# Patient Record
Sex: MALE | Race: WHITE | Employment: OTHER | URBAN - METROPOLITAN AREA
[De-identification: names, ages, dates, MRNs, and addresses within clinical notes are randomized per-mention and may not be internally consistent; named-entity substitution may affect disease eponyms.]

---

## 2017-10-03 RX ORDER — CEFAZOLIN SODIUM 2 G/100ML
2 INJECTION, SOLUTION INTRAVENOUS
Status: CANCELLED | OUTPATIENT
Start: 2017-10-03

## 2017-10-03 RX ORDER — CEFAZOLIN SODIUM 1 G/3ML
1 INJECTION, POWDER, FOR SOLUTION INTRAMUSCULAR; INTRAVENOUS SEE ADMIN INSTRUCTIONS
Status: CANCELLED | OUTPATIENT
Start: 2017-10-03

## 2017-10-03 RX ORDER — ROSUVASTATIN CALCIUM 10 MG/1
10 TABLET, COATED ORAL DAILY
COMMUNITY

## 2017-10-05 ENCOUNTER — HOSPITAL ENCOUNTER (OUTPATIENT)
Dept: LAB | Facility: CLINIC | Age: 72
Discharge: HOME | End: 2017-10-05
Attending: ORTHOPAEDIC SURGERY | Admitting: ORTHOPAEDIC SURGERY
Payer: MEDICARE

## 2017-10-05 DIAGNOSIS — Z01.812 PRE-OPERATIVE LABORATORY EXAMINATION: ICD-10-CM

## 2017-10-05 LAB
MRSA DNA SPEC QL NAA+PROBE: NEGATIVE
SPECIMEN SOURCE: NORMAL

## 2017-10-05 PROCEDURE — 87641 MR-STAPH DNA AMP PROBE: CPT | Performed by: ORTHOPAEDIC SURGERY

## 2017-10-05 PROCEDURE — 40000830 ZZHCL STATISTIC STAPH AUREUS METH RESIST SCREEN PCR: Performed by: ORTHOPAEDIC SURGERY

## 2017-10-05 PROCEDURE — 40000829 ZZHCL STATISTIC STAPH AUREUS SUSCEPT SCREEN PCR: Performed by: ORTHOPAEDIC SURGERY

## 2017-10-05 PROCEDURE — 87640 STAPH A DNA AMP PROBE: CPT | Performed by: ORTHOPAEDIC SURGERY

## 2017-10-06 RX ORDER — CEFAZOLIN SODIUM 2 G/100ML
2 INJECTION, SOLUTION INTRAVENOUS
Status: CANCELLED | OUTPATIENT
Start: 2017-10-06

## 2017-10-06 RX ORDER — CEFAZOLIN SODIUM 1 G/3ML
1 INJECTION, POWDER, FOR SOLUTION INTRAMUSCULAR; INTRAVENOUS SEE ADMIN INSTRUCTIONS
Status: CANCELLED | OUTPATIENT
Start: 2017-10-06

## 2017-10-12 NOTE — PROGRESS NOTES
Pre-Surgery Review for Right Total Hip Arthroplasty on 10/18/2017:   Patient flagged with the following risk factors:   - Cardiac:  1) Abnormal EKG at pre-op 10/3/17 suggestive of possible past myocardial injury-see results below. Echo completed 10/9/17- EF 55%, global and regional left ventricular function normal, normal left ventricular diastolic function.  Per PCP no angina or SOB and able to perform 4 METS w/o any cardiac symptoms so no stress test needed.  Cleared by PCP for surgery.      2) HTN: controlled on Metoprolol, Lisinopril and HCTZ.   3) HLD: Had severe reaction to simvastatin. Appears is now on rosuvastatin.   EKG 10/3/2017: sinus bradycardia, right bundle branch block, left anterior fascicular block and Q waves in the anterior leads suggestive of possible septal infarct.   Echocardiogram 10/9/2017: Left ventricular chamber size is normal. Normal left ventricular wall thickness. Global and regional left ventricular function is normal. Left ventricular ejection fraction is visually estimated at 55%. Normal left ventricular diastolic function.      - Prediabetes: A1C 6.4% on 9/22/17: not currently on any medications- per PCP may consider starting Metformin in the future.   - S/P Right Kidney Donation to brother: No mention of any complications.   - History of Iron Deficiency Anemia 5/2016: evaluated and previously treated by GI with oral iron. Not currently anemic. Hgb 15.6 on 9/22/17.     - Obstructive Sleep Apnea: Follows with [facility]. Uses CPAP- reminded to bring to hospital.   - Depression: on sertraline   Assessment/Plan:  Risk Factors identified above. Consider Hospitalist consult to assist with medical management. Recommend close monitoring of BG s in michael-op period and avoiding IV fluids containing dextrose given history of pre-diabetes. Goal BG <200 to decrease risk of infection and wound healing complications. Notify Surgeon or Hospitalist if BG s >200. Monitor  renal function and avoid nephrotoxins. Encourage use of CPAP. Resume sertraline post-op.

## 2017-10-13 NOTE — PHARMACY-ADMISSION MEDICATION HISTORY
Med rec complete per preadmitting RN:  Elzibeta Bar, RN on 10/3/2017 at 4:22 PM      Prior to Admission medications    Medication Sig Last Dose Taking? Auth Provider   METOPROLOL TARTRATE PO Take 50 mg by mouth 2 times daily  Yes Reported, Patient   LISINOPRIL PO Take 10 mg by mouth daily  Yes Reported, Patient   HYDROCHLOROTHIAZIDE PO Take 25 mg by mouth daily  Yes Reported, Patient   SERTRALINE HCL PO Take 100 mg by mouth daily  Yes Reported, Patient   rosuvastatin (CRESTOR) 10 MG tablet Take 10 mg by mouth daily  Yes Reported, Patient   IBUPROFEN PO Take 400 mg by mouth every 6 hours as needed for moderate pain  Yes Reported, Patient   ASPIRIN PO Take 81 mg by mouth daily  Yes Reported, Patient

## 2017-10-18 ENCOUNTER — ANESTHESIA EVENT (OUTPATIENT)
Dept: SURGERY | Facility: CLINIC | Age: 72
DRG: 470 | End: 2017-10-18
Payer: MEDICARE

## 2017-10-18 ENCOUNTER — ANESTHESIA (OUTPATIENT)
Dept: SURGERY | Facility: CLINIC | Age: 72
DRG: 470 | End: 2017-10-18
Payer: MEDICARE

## 2017-10-18 ENCOUNTER — APPOINTMENT (OUTPATIENT)
Dept: GENERAL RADIOLOGY | Facility: CLINIC | Age: 72
DRG: 470 | End: 2017-10-18
Attending: ORTHOPAEDIC SURGERY
Payer: MEDICARE

## 2017-10-18 ENCOUNTER — HOSPITAL ENCOUNTER (INPATIENT)
Facility: CLINIC | Age: 72
LOS: 2 days | Discharge: HOME | DRG: 470 | End: 2017-10-20
Attending: ORTHOPAEDIC SURGERY | Admitting: ORTHOPAEDIC SURGERY
Payer: MEDICARE

## 2017-10-18 DIAGNOSIS — Z96.641 STATUS POST TOTAL REPLACEMENT OF RIGHT HIP: Primary | ICD-10-CM

## 2017-10-18 LAB
ANION GAP SERPL CALCULATED.3IONS-SCNC: 6 MMOL/L (ref 3–14)
BASOPHILS # BLD AUTO: 0 10E9/L (ref 0–0.2)
BASOPHILS NFR BLD AUTO: 0.2 %
BUN SERPL-MCNC: 23 MG/DL (ref 7–30)
CALCIUM SERPL-MCNC: 8.2 MG/DL (ref 8.5–10.1)
CHLORIDE SERPL-SCNC: 100 MMOL/L (ref 94–109)
CO2 SERPL-SCNC: 31 MMOL/L (ref 20–32)
CREAT SERPL-MCNC: 1.31 MG/DL (ref 0.66–1.25)
DIFFERENTIAL METHOD BLD: ABNORMAL
EOSINOPHIL # BLD AUTO: 0 10E9/L (ref 0–0.7)
EOSINOPHIL NFR BLD AUTO: 0.1 %
ERYTHROCYTE [DISTWIDTH] IN BLOOD BY AUTOMATED COUNT: 13.2 % (ref 10–15)
GFR SERPL CREATININE-BSD FRML MDRD: 54 ML/MIN/1.7M2
GLUCOSE BLDC GLUCOMTR-MCNC: 142 MG/DL (ref 70–99)
GLUCOSE BLDC GLUCOMTR-MCNC: 202 MG/DL (ref 70–99)
GLUCOSE SERPL-MCNC: 202 MG/DL (ref 70–99)
HCT VFR BLD AUTO: 39.4 % (ref 40–53)
HGB BLD-MCNC: 13 G/DL (ref 13.3–17.7)
IMM GRANULOCYTES # BLD: 0 10E9/L (ref 0–0.4)
IMM GRANULOCYTES NFR BLD: 0.3 %
LYMPHOCYTES # BLD AUTO: 0.8 10E9/L (ref 0.8–5.3)
LYMPHOCYTES NFR BLD AUTO: 6.4 %
MAGNESIUM SERPL-MCNC: 2.3 MG/DL (ref 1.6–2.3)
MCH RBC QN AUTO: 28.4 PG (ref 26.5–33)
MCHC RBC AUTO-ENTMCNC: 33 G/DL (ref 31.5–36.5)
MCV RBC AUTO: 86 FL (ref 78–100)
MONOCYTES # BLD AUTO: 0.2 10E9/L (ref 0–1.3)
MONOCYTES NFR BLD AUTO: 1.3 %
NEUTROPHILS # BLD AUTO: 11 10E9/L (ref 1.6–8.3)
NEUTROPHILS NFR BLD AUTO: 91.7 %
NRBC # BLD AUTO: 0 10*3/UL
NRBC BLD AUTO-RTO: 0 /100
PLATELET # BLD AUTO: 211 10E9/L (ref 150–450)
POTASSIUM SERPL-SCNC: 3.4 MMOL/L (ref 3.4–5.3)
RBC # BLD AUTO: 4.58 10E12/L (ref 4.4–5.9)
SODIUM SERPL-SCNC: 137 MMOL/L (ref 133–144)
TROPONIN I SERPL-MCNC: <0.015 UG/L (ref 0–0.04)
TROPONIN I SERPL-MCNC: <0.015 UG/L (ref 0–0.04)
WBC # BLD AUTO: 12 10E9/L (ref 4–11)

## 2017-10-18 PROCEDURE — 27210794 ZZH OR GENERAL SUPPLY STERILE: Performed by: ORTHOPAEDIC SURGERY

## 2017-10-18 PROCEDURE — 83735 ASSAY OF MAGNESIUM: CPT | Performed by: INTERNAL MEDICINE

## 2017-10-18 PROCEDURE — 25000128 H RX IP 250 OP 636: Performed by: ORTHOPAEDIC SURGERY

## 2017-10-18 PROCEDURE — 36415 COLL VENOUS BLD VENIPUNCTURE: CPT | Performed by: INTERNAL MEDICINE

## 2017-10-18 PROCEDURE — A9270 NON-COVERED ITEM OR SERVICE: HCPCS | Mod: GY | Performed by: ORTHOPAEDIC SURGERY

## 2017-10-18 PROCEDURE — 36000063 ZZH SURGERY LEVEL 4 EA 15 ADDTL MIN: Performed by: ORTHOPAEDIC SURGERY

## 2017-10-18 PROCEDURE — 25000128 H RX IP 250 OP 636: Performed by: ANESTHESIOLOGY

## 2017-10-18 PROCEDURE — 99207 ZZC CONSULT E&M CHANGED TO INITIAL LEVEL: CPT | Performed by: INTERNAL MEDICINE

## 2017-10-18 PROCEDURE — 25000125 ZZHC RX 250: Performed by: NURSE ANESTHETIST, CERTIFIED REGISTERED

## 2017-10-18 PROCEDURE — C1776 JOINT DEVICE (IMPLANTABLE): HCPCS | Performed by: ORTHOPAEDIC SURGERY

## 2017-10-18 PROCEDURE — 93005 ELECTROCARDIOGRAM TRACING: CPT

## 2017-10-18 PROCEDURE — 93010 ELECTROCARDIOGRAM REPORT: CPT | Performed by: INTERNAL MEDICINE

## 2017-10-18 PROCEDURE — S5010 5% DEXTROSE AND 0.45% SALINE: HCPCS | Performed by: ORTHOPAEDIC SURGERY

## 2017-10-18 PROCEDURE — 37000008 ZZH ANESTHESIA TECHNICAL FEE, 1ST 30 MIN: Performed by: ORTHOPAEDIC SURGERY

## 2017-10-18 PROCEDURE — 25000131 ZZH RX MED GY IP 250 OP 636 PS 637: Mod: GY | Performed by: INTERNAL MEDICINE

## 2017-10-18 PROCEDURE — 25000128 H RX IP 250 OP 636: Performed by: NURSE ANESTHETIST, CERTIFIED REGISTERED

## 2017-10-18 PROCEDURE — 99222 1ST HOSP IP/OBS MODERATE 55: CPT | Performed by: INTERNAL MEDICINE

## 2017-10-18 PROCEDURE — 37000009 ZZH ANESTHESIA TECHNICAL FEE, EACH ADDTL 15 MIN: Performed by: ORTHOPAEDIC SURGERY

## 2017-10-18 PROCEDURE — 36000093 ZZH SURGERY LEVEL 4 1ST 30 MIN: Performed by: ORTHOPAEDIC SURGERY

## 2017-10-18 PROCEDURE — 12000007 ZZH R&B INTERMEDIATE

## 2017-10-18 PROCEDURE — 85025 COMPLETE CBC W/AUTO DIFF WBC: CPT | Performed by: INTERNAL MEDICINE

## 2017-10-18 PROCEDURE — 40000985 XR PELVIS AD HIP PORTABLE RIGHT 1 VIEW

## 2017-10-18 PROCEDURE — 25000566 ZZH SEVOFLURANE, EA 15 MIN: Performed by: ORTHOPAEDIC SURGERY

## 2017-10-18 PROCEDURE — 25800025 ZZH RX 258: Performed by: ORTHOPAEDIC SURGERY

## 2017-10-18 PROCEDURE — 40000306 ZZH STATISTIC PRE PROC ASSESS II: Performed by: ORTHOPAEDIC SURGERY

## 2017-10-18 PROCEDURE — 27810169 ZZH OR IMPLANT GENERAL: Performed by: ORTHOPAEDIC SURGERY

## 2017-10-18 PROCEDURE — 25000132 ZZH RX MED GY IP 250 OP 250 PS 637: Mod: GY | Performed by: ORTHOPAEDIC SURGERY

## 2017-10-18 PROCEDURE — 40000275 ZZH STATISTIC RCP TIME EA 10 MIN

## 2017-10-18 PROCEDURE — 0SR9019 REPLACEMENT OF RIGHT HIP JOINT WITH METAL SYNTHETIC SUBSTITUTE, CEMENTED, OPEN APPROACH: ICD-10-PCS | Performed by: ORTHOPAEDIC SURGERY

## 2017-10-18 PROCEDURE — 00000146 ZZHCL STATISTIC GLUCOSE BY METER IP

## 2017-10-18 PROCEDURE — 84484 ASSAY OF TROPONIN QUANT: CPT | Performed by: INTERNAL MEDICINE

## 2017-10-18 PROCEDURE — 80048 BASIC METABOLIC PNL TOTAL CA: CPT | Performed by: INTERNAL MEDICINE

## 2017-10-18 PROCEDURE — 71000013 ZZH RECOVERY PHASE 1 LEVEL 1 EA ADDTL HR: Performed by: ORTHOPAEDIC SURGERY

## 2017-10-18 PROCEDURE — 71000012 ZZH RECOVERY PHASE 1 LEVEL 1 FIRST HR: Performed by: ORTHOPAEDIC SURGERY

## 2017-10-18 DEVICE — IMP HOLE COVER SNN ACETAB CUP REFLEC INTERFIT 71336500: Type: IMPLANTABLE DEVICE | Site: HIP | Status: FUNCTIONAL

## 2017-10-18 DEVICE — IMP HEAD FEMORAL SNN 12/14 OXIN 36MM -3MM 71343603: Type: IMPLANTABLE DEVICE | Site: HIP | Status: FUNCTIONAL

## 2017-10-18 DEVICE — IMPLANTABLE DEVICE: Type: IMPLANTABLE DEVICE | Site: HIP | Status: FUNCTIONAL

## 2017-10-18 RX ORDER — ACETAMINOPHEN 10 MG/ML
1000 INJECTION, SOLUTION INTRAVENOUS ONCE
Status: DISCONTINUED | OUTPATIENT
Start: 2017-10-18 | End: 2017-10-18 | Stop reason: HOSPADM

## 2017-10-18 RX ORDER — FENTANYL CITRATE 50 UG/ML
INJECTION, SOLUTION INTRAMUSCULAR; INTRAVENOUS PRN
Status: DISCONTINUED | OUTPATIENT
Start: 2017-10-18 | End: 2017-10-18

## 2017-10-18 RX ORDER — CYCLOBENZAPRINE HCL 10 MG
10 TABLET ORAL 3 TIMES DAILY PRN
Status: DISCONTINUED | OUTPATIENT
Start: 2017-10-18 | End: 2017-10-20 | Stop reason: HOSPADM

## 2017-10-18 RX ORDER — DEXAMETHASONE SODIUM PHOSPHATE 4 MG/ML
INJECTION, SOLUTION INTRA-ARTICULAR; INTRALESIONAL; INTRAMUSCULAR; INTRAVENOUS; SOFT TISSUE PRN
Status: DISCONTINUED | OUTPATIENT
Start: 2017-10-18 | End: 2017-10-18

## 2017-10-18 RX ORDER — NEOSTIGMINE METHYLSULFATE 1 MG/ML
VIAL (ML) INJECTION PRN
Status: DISCONTINUED | OUTPATIENT
Start: 2017-10-18 | End: 2017-10-18

## 2017-10-18 RX ORDER — METOPROLOL TARTRATE 50 MG
50 TABLET ORAL 2 TIMES DAILY
Status: DISCONTINUED | OUTPATIENT
Start: 2017-10-18 | End: 2017-10-18 | Stop reason: CLARIF

## 2017-10-18 RX ORDER — ONDANSETRON 2 MG/ML
INJECTION INTRAMUSCULAR; INTRAVENOUS PRN
Status: DISCONTINUED | OUTPATIENT
Start: 2017-10-18 | End: 2017-10-18

## 2017-10-18 RX ORDER — DIMENHYDRINATE 50 MG/ML
25 INJECTION, SOLUTION INTRAMUSCULAR; INTRAVENOUS
Status: DISCONTINUED | OUTPATIENT
Start: 2017-10-18 | End: 2017-10-18 | Stop reason: HOSPADM

## 2017-10-18 RX ORDER — POTASSIUM CHLORIDE 1.5 G/1.58G
20-40 POWDER, FOR SOLUTION ORAL
Status: DISCONTINUED | OUTPATIENT
Start: 2017-10-18 | End: 2017-10-20 | Stop reason: HOSPADM

## 2017-10-18 RX ORDER — GLYCOPYRROLATE 0.2 MG/ML
INJECTION, SOLUTION INTRAMUSCULAR; INTRAVENOUS PRN
Status: DISCONTINUED | OUTPATIENT
Start: 2017-10-18 | End: 2017-10-18

## 2017-10-18 RX ORDER — POTASSIUM CHLORIDE 7.45 MG/ML
10 INJECTION INTRAVENOUS
Status: DISCONTINUED | OUTPATIENT
Start: 2017-10-18 | End: 2017-10-20 | Stop reason: HOSPADM

## 2017-10-18 RX ORDER — MEPERIDINE HYDROCHLORIDE 25 MG/ML
12.5 INJECTION INTRAMUSCULAR; INTRAVENOUS; SUBCUTANEOUS EVERY 5 MIN PRN
Status: DISCONTINUED | OUTPATIENT
Start: 2017-10-18 | End: 2017-10-18 | Stop reason: HOSPADM

## 2017-10-18 RX ORDER — LIDOCAINE 40 MG/G
CREAM TOPICAL
Status: DISCONTINUED | OUTPATIENT
Start: 2017-10-18 | End: 2017-10-20 | Stop reason: HOSPADM

## 2017-10-18 RX ORDER — DROPERIDOL 2.5 MG/ML
0.62 INJECTION, SOLUTION INTRAMUSCULAR; INTRAVENOUS
Status: DISCONTINUED | OUTPATIENT
Start: 2017-10-18 | End: 2017-10-18 | Stop reason: RX

## 2017-10-18 RX ORDER — LABETALOL HYDROCHLORIDE 5 MG/ML
10 INJECTION, SOLUTION INTRAVENOUS
Status: DISCONTINUED | OUTPATIENT
Start: 2017-10-18 | End: 2017-10-18 | Stop reason: HOSPADM

## 2017-10-18 RX ORDER — LISINOPRIL 10 MG/1
10 TABLET ORAL DAILY
Status: DISCONTINUED | OUTPATIENT
Start: 2017-10-19 | End: 2017-10-20 | Stop reason: HOSPADM

## 2017-10-18 RX ORDER — POTASSIUM CHLORIDE 29.8 MG/ML
20 INJECTION INTRAVENOUS
Status: DISCONTINUED | OUTPATIENT
Start: 2017-10-18 | End: 2017-10-20 | Stop reason: HOSPADM

## 2017-10-18 RX ORDER — HYDROMORPHONE HYDROCHLORIDE 1 MG/ML
.3-.5 INJECTION, SOLUTION INTRAMUSCULAR; INTRAVENOUS; SUBCUTANEOUS EVERY 5 MIN PRN
Status: DISCONTINUED | OUTPATIENT
Start: 2017-10-18 | End: 2017-10-18 | Stop reason: HOSPADM

## 2017-10-18 RX ORDER — NALOXONE HYDROCHLORIDE 0.4 MG/ML
.1-.4 INJECTION, SOLUTION INTRAMUSCULAR; INTRAVENOUS; SUBCUTANEOUS
Status: DISCONTINUED | OUTPATIENT
Start: 2017-10-18 | End: 2017-10-20 | Stop reason: HOSPADM

## 2017-10-18 RX ORDER — ACETAMINOPHEN 325 MG/1
325 TABLET ORAL EVERY 8 HOURS
Status: DISCONTINUED | OUTPATIENT
Start: 2017-10-18 | End: 2017-10-20 | Stop reason: HOSPADM

## 2017-10-18 RX ORDER — KETOROLAC TROMETHAMINE 30 MG/ML
INJECTION, SOLUTION INTRAMUSCULAR; INTRAVENOUS PRN
Status: DISCONTINUED | OUTPATIENT
Start: 2017-10-18 | End: 2017-10-18

## 2017-10-18 RX ORDER — HYDROMORPHONE HYDROCHLORIDE 1 MG/ML
.3-.5 INJECTION, SOLUTION INTRAMUSCULAR; INTRAVENOUS; SUBCUTANEOUS
Status: DISCONTINUED | OUTPATIENT
Start: 2017-10-18 | End: 2017-10-20 | Stop reason: HOSPADM

## 2017-10-18 RX ORDER — POTASSIUM CL/LIDO/0.9 % NACL 10MEQ/0.1L
10 INTRAVENOUS SOLUTION, PIGGYBACK (ML) INTRAVENOUS
Status: DISCONTINUED | OUTPATIENT
Start: 2017-10-18 | End: 2017-10-20 | Stop reason: HOSPADM

## 2017-10-18 RX ORDER — PROPOFOL 10 MG/ML
INJECTION, EMULSION INTRAVENOUS PRN
Status: DISCONTINUED | OUTPATIENT
Start: 2017-10-18 | End: 2017-10-18

## 2017-10-18 RX ORDER — POTASSIUM CHLORIDE 1500 MG/1
20-40 TABLET, EXTENDED RELEASE ORAL
Status: DISCONTINUED | OUTPATIENT
Start: 2017-10-18 | End: 2017-10-20 | Stop reason: HOSPADM

## 2017-10-18 RX ORDER — ROSUVASTATIN CALCIUM 5 MG/1
10 TABLET, COATED ORAL DAILY
Status: DISCONTINUED | OUTPATIENT
Start: 2017-10-18 | End: 2017-10-20 | Stop reason: HOSPADM

## 2017-10-18 RX ORDER — SERTRALINE HYDROCHLORIDE 100 MG/1
100 TABLET, FILM COATED ORAL DAILY
Status: DISCONTINUED | OUTPATIENT
Start: 2017-10-18 | End: 2017-10-20 | Stop reason: HOSPADM

## 2017-10-18 RX ORDER — LIDOCAINE 40 MG/G
CREAM TOPICAL
Status: DISCONTINUED | OUTPATIENT
Start: 2017-10-18 | End: 2017-10-18

## 2017-10-18 RX ORDER — CEFAZOLIN SODIUM 2 G/100ML
2 INJECTION, SOLUTION INTRAVENOUS
Status: COMPLETED | OUTPATIENT
Start: 2017-10-18 | End: 2017-10-18

## 2017-10-18 RX ORDER — ONDANSETRON 2 MG/ML
4 INJECTION INTRAMUSCULAR; INTRAVENOUS EVERY 30 MIN PRN
Status: DISCONTINUED | OUTPATIENT
Start: 2017-10-18 | End: 2017-10-18 | Stop reason: HOSPADM

## 2017-10-18 RX ORDER — ONDANSETRON 4 MG/1
4 TABLET, ORALLY DISINTEGRATING ORAL EVERY 30 MIN PRN
Status: DISCONTINUED | OUTPATIENT
Start: 2017-10-18 | End: 2017-10-18 | Stop reason: HOSPADM

## 2017-10-18 RX ORDER — OXYCODONE HYDROCHLORIDE 5 MG/1
5-10 TABLET ORAL
Status: DISCONTINUED | OUTPATIENT
Start: 2017-10-18 | End: 2017-10-20 | Stop reason: HOSPADM

## 2017-10-18 RX ORDER — SODIUM CHLORIDE, SODIUM LACTATE, POTASSIUM CHLORIDE, CALCIUM CHLORIDE 600; 310; 30; 20 MG/100ML; MG/100ML; MG/100ML; MG/100ML
INJECTION, SOLUTION INTRAVENOUS CONTINUOUS
Status: DISCONTINUED | OUTPATIENT
Start: 2017-10-18 | End: 2017-10-19 | Stop reason: CLARIF

## 2017-10-18 RX ORDER — NICOTINE POLACRILEX 4 MG
15-30 LOZENGE BUCCAL
Status: DISCONTINUED | OUTPATIENT
Start: 2017-10-18 | End: 2017-10-20 | Stop reason: HOSPADM

## 2017-10-18 RX ORDER — DEXTROSE MONOHYDRATE 25 G/50ML
25-50 INJECTION, SOLUTION INTRAVENOUS
Status: DISCONTINUED | OUTPATIENT
Start: 2017-10-18 | End: 2017-10-20 | Stop reason: HOSPADM

## 2017-10-18 RX ORDER — MAGNESIUM SULFATE HEPTAHYDRATE 40 MG/ML
4 INJECTION, SOLUTION INTRAVENOUS EVERY 4 HOURS PRN
Status: DISCONTINUED | OUTPATIENT
Start: 2017-10-18 | End: 2017-10-20 | Stop reason: HOSPADM

## 2017-10-18 RX ORDER — HYDROCHLOROTHIAZIDE 25 MG/1
25 TABLET ORAL DAILY
Status: DISCONTINUED | OUTPATIENT
Start: 2017-10-18 | End: 2017-10-20 | Stop reason: HOSPADM

## 2017-10-18 RX ORDER — PROPOFOL 10 MG/ML
INJECTION, EMULSION INTRAVENOUS CONTINUOUS PRN
Status: DISCONTINUED | OUTPATIENT
Start: 2017-10-18 | End: 2017-10-18

## 2017-10-18 RX ORDER — ALBUTEROL SULFATE 0.83 MG/ML
2.5 SOLUTION RESPIRATORY (INHALATION) EVERY 4 HOURS PRN
Status: DISCONTINUED | OUTPATIENT
Start: 2017-10-18 | End: 2017-10-18 | Stop reason: HOSPADM

## 2017-10-18 RX ORDER — FENTANYL CITRATE 50 UG/ML
25-50 INJECTION, SOLUTION INTRAMUSCULAR; INTRAVENOUS
Status: DISCONTINUED | OUTPATIENT
Start: 2017-10-18 | End: 2017-10-18 | Stop reason: HOSPADM

## 2017-10-18 RX ORDER — SODIUM CHLORIDE, SODIUM LACTATE, POTASSIUM CHLORIDE, CALCIUM CHLORIDE 600; 310; 30; 20 MG/100ML; MG/100ML; MG/100ML; MG/100ML
INJECTION, SOLUTION INTRAVENOUS CONTINUOUS
Status: DISCONTINUED | OUTPATIENT
Start: 2017-10-18 | End: 2017-10-18 | Stop reason: HOSPADM

## 2017-10-18 RX ORDER — DIAZEPAM 10 MG/2ML
2.5 INJECTION, SOLUTION INTRAMUSCULAR; INTRAVENOUS
Status: DISCONTINUED | OUTPATIENT
Start: 2017-10-18 | End: 2017-10-18 | Stop reason: HOSPADM

## 2017-10-18 RX ORDER — CEFAZOLIN SODIUM 2 G/100ML
2 INJECTION, SOLUTION INTRAVENOUS EVERY 8 HOURS
Status: COMPLETED | OUTPATIENT
Start: 2017-10-18 | End: 2017-10-19

## 2017-10-18 RX ORDER — CEFAZOLIN SODIUM 1 G/3ML
1 INJECTION, POWDER, FOR SOLUTION INTRAMUSCULAR; INTRAVENOUS SEE ADMIN INSTRUCTIONS
Status: DISCONTINUED | OUTPATIENT
Start: 2017-10-18 | End: 2017-10-18

## 2017-10-18 RX ORDER — LIDOCAINE HYDROCHLORIDE 10 MG/ML
INJECTION, SOLUTION INFILTRATION; PERINEURAL PRN
Status: DISCONTINUED | OUTPATIENT
Start: 2017-10-18 | End: 2017-10-18

## 2017-10-18 RX ADMIN — SERTRALINE HYDROCHLORIDE 100 MG: 100 TABLET ORAL at 18:22

## 2017-10-18 RX ADMIN — ACETAMINOPHEN 325 MG: 325 TABLET, FILM COATED ORAL at 23:52

## 2017-10-18 RX ADMIN — PROPOFOL 200 MG: 10 INJECTION, EMULSION INTRAVENOUS at 09:47

## 2017-10-18 RX ADMIN — INSULIN ASPART 1 UNITS: 100 INJECTION, SOLUTION INTRAVENOUS; SUBCUTANEOUS at 18:21

## 2017-10-18 RX ADMIN — CEFAZOLIN SODIUM 2 G: 2 INJECTION, SOLUTION INTRAVENOUS at 09:42

## 2017-10-18 RX ADMIN — OXYCODONE HYDROCHLORIDE 10 MG: 5 TABLET ORAL at 21:58

## 2017-10-18 RX ADMIN — HYDROMORPHONE HYDROCHLORIDE 1 MG: 1 INJECTION, SOLUTION INTRAMUSCULAR; INTRAVENOUS; SUBCUTANEOUS at 09:47

## 2017-10-18 RX ADMIN — LIDOCAINE HYDROCHLORIDE 30 MG: 10 INJECTION, SOLUTION INFILTRATION; PERINEURAL at 09:47

## 2017-10-18 RX ADMIN — DEXTROSE AND SODIUM CHLORIDE: 5; 450 INJECTION, SOLUTION INTRAVENOUS at 14:46

## 2017-10-18 RX ADMIN — FENTANYL CITRATE 100 MCG: 50 INJECTION, SOLUTION INTRAMUSCULAR; INTRAVENOUS at 09:47

## 2017-10-18 RX ADMIN — SODIUM CHLORIDE, POTASSIUM CHLORIDE, SODIUM LACTATE AND CALCIUM CHLORIDE: 600; 310; 30; 20 INJECTION, SOLUTION INTRAVENOUS at 09:42

## 2017-10-18 RX ADMIN — ROSUVASTATIN CALCIUM 10 MG: 5 TABLET, FILM COATED ORAL at 18:22

## 2017-10-18 RX ADMIN — GLYCOPYRROLATE 0.5 MG: 0.2 INJECTION, SOLUTION INTRAMUSCULAR; INTRAVENOUS at 10:52

## 2017-10-18 RX ADMIN — PHENYLEPHRINE HYDROCHLORIDE 100 MCG: 10 INJECTION, SOLUTION INTRAMUSCULAR; INTRAVENOUS; SUBCUTANEOUS at 10:30

## 2017-10-18 RX ADMIN — HYDROMORPHONE HYDROCHLORIDE 0.5 MG: 1 INJECTION, SOLUTION INTRAMUSCULAR; INTRAVENOUS; SUBCUTANEOUS at 23:33

## 2017-10-18 RX ADMIN — MIDAZOLAM HYDROCHLORIDE 2 MG: 1 INJECTION, SOLUTION INTRAMUSCULAR; INTRAVENOUS at 09:42

## 2017-10-18 RX ADMIN — ACETAMINOPHEN 325 MG: 325 TABLET, FILM COATED ORAL at 18:23

## 2017-10-18 RX ADMIN — ROCURONIUM BROMIDE 50 MG: 10 INJECTION INTRAVENOUS at 09:47

## 2017-10-18 RX ADMIN — HYDROCHLOROTHIAZIDE 25 MG: 25 TABLET ORAL at 18:22

## 2017-10-18 RX ADMIN — CEFAZOLIN SODIUM 2 G: 2 INJECTION, SOLUTION INTRAVENOUS at 18:21

## 2017-10-18 RX ADMIN — DEXAMETHASONE SODIUM PHOSPHATE 8 MG: 4 INJECTION, SOLUTION INTRA-ARTICULAR; INTRALESIONAL; INTRAMUSCULAR; INTRAVENOUS; SOFT TISSUE at 09:47

## 2017-10-18 RX ADMIN — Medication 3 MG: at 10:52

## 2017-10-18 RX ADMIN — PROPOFOL 70 MCG/KG/MIN: 10 INJECTION, EMULSION INTRAVENOUS at 09:54

## 2017-10-18 RX ADMIN — SODIUM CHLORIDE, POTASSIUM CHLORIDE, SODIUM LACTATE AND CALCIUM CHLORIDE: 600; 310; 30; 20 INJECTION, SOLUTION INTRAVENOUS at 10:39

## 2017-10-18 RX ADMIN — SODIUM CHLORIDE, POTASSIUM CHLORIDE, SODIUM LACTATE AND CALCIUM CHLORIDE: 600; 310; 30; 20 INJECTION, SOLUTION INTRAVENOUS at 17:04

## 2017-10-18 RX ADMIN — GLYCOPYRROLATE 0.2 MG: 0.2 INJECTION, SOLUTION INTRAMUSCULAR; INTRAVENOUS at 09:47

## 2017-10-18 RX ADMIN — ONDANSETRON 4 MG: 2 INJECTION INTRAMUSCULAR; INTRAVENOUS at 09:47

## 2017-10-18 RX ADMIN — KETOROLAC TROMETHAMINE 30 MG: 30 INJECTION, SOLUTION INTRAMUSCULAR at 09:47

## 2017-10-18 RX ADMIN — CYCLOBENZAPRINE HYDROCHLORIDE 10 MG: 10 TABLET, FILM COATED ORAL at 22:54

## 2017-10-18 ASSESSMENT — ACTIVITIES OF DAILY LIVING (ADL): ADLS_ACUITY_SCORE: 10

## 2017-10-18 NOTE — IP AVS SNAPSHOT
MRN:[id number]                      After Visit Summary   10/18/2017    Dougie Portillo    MRN: [id number]           Thank you!     Thank you for choosing [facility] for your care. Our goal is always to provide you with excellent care. Hearing back from our patients is one way we can continue to improve our services. Please take a few minutes to complete the written survey that you may receive in the mail after you visit. If you would like to speak to someone directly about your visit please contact Patient Relations at [phone]. Thank you!          Patient Information     Date Of Birth          1945        Designated Caregiver       Most Recent Value    Caregiver    Will someone help with your care after discharge? no      About your hospital stay     You were admitted on:  October 18, 2017 You last received care in the:  [facility]    You were discharged on:  October 20, 2017        Reason for your hospital stay       Please refer to discharge summary                  Who to Call     For medical emergencies, please call 911.  For non-urgent questions about your medical care, please call your primary care provider or clinic, [phone]  For questions related to your surgery, please call your surgery clinic        Attending Provider     Provider Specialty    Jose Caldwell MD Orthopedics       Primary Care Provider Office Phone # Fax #    Markus Mccoy [phone]      After Care Instructions     Activity       Your activity upon discharge: activity as tolerated            Diet       Follow this diet upon discharge: Orders Placed This Encounter      Combination Diet 4302-1703 Calories: Moderate Consistent CHO (4-6 CHO units/meal); Low Saturated Fat Na <2400mg Diet            Discharge Instructions       Please call or come if you have any new or worsening symptoms  Watch for lightheadedness, dizziness, chest pain, shortness of breath  palpitations            Monitor and record       blood glucose    Monitor heart Rate  Monitor Blood Pressure    Follow up with primary care for management of DM and HTN  As we have stopped metoprolol there may be a need to adjust other BP medications  Once better would recommend stress test as out patient  Metoprolol was stopped secondary to slow heart rate                  Follow-up Appointments     Follow-up and recommended labs and tests        Follow up with Dr. Caldwell next week.            Follow-up and recommended labs and tests        Follow up with primary care provider, Markus Mccoy, within 7 days for hospital follow- up, regarding new diagnosis and to follow up on results.  The following labs/tests are recommended: cbc.            Follow-up and recommended labs and tests        Follow up with primary care provider, Markus Mccoy, within 7 days to evaluate medication change, to evaluate treatment change, to evaluate after surgery and for hospital follow- up.  The following labs/tests are recommended: CBC and basic metabolic panel.    Your wbc count was elevated post op likely stress related. Please follow up with primary with repeat lab tests to follow up  Please watch for fever, chills, cp, sob or any new or worsening symptoms                  Further instructions from your care team       Call surgeon or primary care md before or after your follow appointment if any of these issues occur:  1.Uncontrolled/persistant temperature, chills, sweats. Temp >101  2. Uncontrolled pain despite pain medication  3. Numbness or tingling in operative leg, weakness, falls  4. Increased/persistant bleeding,redness,swelling, bruising, drainage (yellow/odourous), or bleeding from or around incision or incision pulling apart.  5. Dizziness, lightheaded (could be pain meds)  6. Calf pain, hard/swollen/warm area, chest pain or shortness of breath   7. Constipation despite taking over the counter stool softner and laxative  for constipation   8. Trouble voiding or signs or symptoms of urinary tract infection  9. Uncontrolled bleeding (nose bleed, incision)  10.  if unable to wake call 911    Please have all family and caregivers review this information.    Other instructions:  See general discharge instruction sheet for hips.  1. Do exercises as instructed by therapist-slowly increased activity as pain tolerates  2. Observe your hip precautions.  3. Walk daily increasing distance and time each day by a little.  4. Ice hip for swelling and discomfort 3-4 times daily for 20 minutes  5.Notify your dentist of your implant so you can get antibiotics before any dental work or for any other invasive procedure.  6. Take over the counter stool softener (mirilax, senna, colace) while on narcotics to prevent constipation so bowel movements are regular., take laxative (milk of magnesia) or a suppository if no bm in 2-3 days (take as directed)  8.Keep track of when you take all medication, especially pain medication. See bottles for instructions and side effects  9. Incentive spirometer hourly to help prevent pneumonia  10. See medication handouts for medication information and side effects  11. DO NOT DRINK alcohol or DRIVE on narcotic pain medication.    Friends and family please read and review all discharge information and medication sheet    IF unable to wake call 911    Dressing information:Do not change dressing  Can shower with aquacell dressing, it is waterproof-do not remove the dressing will be changed at the follow up apt. with your suregon  Inspect surrounding skin daily for s/s of infections.   Do not soak in tub or pool.   If dressing  Loosens wash hands and tape edge down then call surgeon for direction-do not touch incision   If dressing is 1/2 or more full of drainage or leaking call your suregon  If dressing is half full of drainage call your surgeon      Follow up Appointment:  Make follow up apt with Dr. Caldwell in 10-14 days  "post surgery,call to make apt. [phone]. Call the same number with any questions or issues prior to or after apt.        Pending Results     No orders found from 10/16/2017 to 10/19/2017.            Statement of Approval     Ordered          10/19/17 2054  I have reviewed and agree with all the recommendations and orders detailed in this document.  EFFECTIVE NOW     Approved and electronically signed by:  Iva Fuentes MD           10/19/17 1418  I have reviewed and agree with all the recommendations and orders detailed in this document.  EFFECTIVE NOW     Approved and electronically signed by:  Jose Caldwell MD             Admission Information     Date & Time Provider Department Dept. Phone    10/18/2017 Jose Caldwell MD [facility] [phone]      Your Vitals Were     Blood Pressure Pulse Temperature Respirations Height Weight    131/76 (BP Location: Right arm) 58 98.9  F (37.2  C) (Oral) 16 1.854 m (6' 1\") 109.8 kg (242 lb)    Pulse Oximetry BMI (Body Mass Index)                99% 32.82 kg/m2          MyChart Information     [employer] lets you send messages to your doctor, view your test results, renew your prescriptions, schedule appointments and more. To sign up, go to [email or web address] . Click on \"Log in\" on the left side of the screen, which will take you to the Welcome page. Then click on \"Sign up Now\" on the right side of the page.     You will be asked to enter the access code listed below, as well as some personal information. Please follow the directions to create your username and password.     Your access code is: [email or web address]  Expires: 2018  1:55 PM     Your access code will  in 90 days. If you need help or a new code, please call your [facility] clinic or [phone].        Care EveryWhere ID     This is your Care EveryWhere ID. This could be used by other organizations to access your [facility] medical records  [id number]        Equal Access " to Services     [facility]: Hadii demetria Martinez, waaxda luqadaha, qaybta kaalmada matheus, valeria greene. So [facility] [phone].    ATENCIÓN: Si habla español, tiene a stratton disposición servicios gratuitos de asistencia lingüística. Llame al [phone].    We comply with applicable federal civil rights laws and [place] laws. We do not discriminate on the basis of race, color, national origin, age, disability, sex, sexual orientation, or gender identity.               Review of your medicines      START taking        Dose / Directions    oxyCODONE 5 MG IR tablet   Commonly known as:  ROXICODONE        Dose:  5-10 mg   Take 1-2 tablets (5-10 mg) by mouth every 3 hours as needed for moderate to severe pain   Quantity:  30 tablet   Refills:  0       rivaroxaban ANTICOAGULANT 10 MG Tabs tablet   Commonly known as:  XARELTO        Dose:  10 mg   Take 1 tablet (10 mg) by mouth daily for 14 days   Quantity:  14 tablet   Refills:  0         CONTINUE these medicines which have NOT CHANGED        Dose / Directions    ASPIRIN PO        Dose:  81 mg   Take 81 mg by mouth daily   Refills:  0       HYDROCHLOROTHIAZIDE PO        Dose:  25 mg   Take 25 mg by mouth daily   Refills:  0       IBUPROFEN PO        Dose:  400 mg   Take 400 mg by mouth every 6 hours as needed for moderate pain   Refills:  0       LISINOPRIL PO        Dose:  10 mg   Take 10 mg by mouth daily   Refills:  0       rosuvastatin 10 MG tablet   Commonly known as:  CRESTOR        Dose:  10 mg   Take 10 mg by mouth daily   Refills:  0       SERTRALINE HCL PO        Dose:  100 mg   Take 100 mg by mouth daily   Refills:  0         STOP taking     METOPROLOL TARTRATE PO                Where to get your medicines      These medications were sent to [facility] [address] [facility]  [address] [facility] [address]     Phone:  [phone]     rivaroxaban ANTICOAGULANT 10 MG Tabs  tablet         Some of these will need a paper prescription and others can be bought over the counter. Ask your nurse if you have questions.     Bring a paper prescription for each of these medications     oxyCODONE 5 MG IR tablet                Protect others around you: Learn how to safely use, store and throw away your medicines at www.disposemymeds.org.             Medication List: This is a list of all your medications and when to take them. Check marks below indicate your daily home schedule. Keep this list as a reference.      Medications           Morning Afternoon Evening Bedtime As Needed    ASPIRIN PO   Take 81 mg by mouth daily   Next Dose Due:  Saturday Morning                                   HYDROCHLOROTHIAZIDE PO   Take 25 mg by mouth daily   Last time this was given:  25 mg on 10/20/2017  8:45 AM   Next Dose Due:  Saturday Morning                                   IBUPROFEN PO   Take 400 mg by mouth every 6 hours as needed for moderate pain                                   LISINOPRIL PO   Take 10 mg by mouth daily   Last time this was given:  10 mg on 10/20/2017  8:45 AM   Next Dose Due:  Saturday Morning                                   oxyCODONE 5 MG IR tablet   Commonly known as:  ROXICODONE   Take 1-2 tablets (5-10 mg) by mouth every 3 hours as needed for moderate to severe pain   Last time this was given:  5 mg on 10/20/2017  8:45 AM   Next Dose Due:  Today Anytime                                rivaroxaban ANTICOAGULANT 10 MG Tabs tablet   Commonly known as:  XARELTO   Take 1 tablet (10 mg) by mouth daily for 14 days   Last time this was given:  10 mg on 10/20/2017  8:45 AM   Next Dose Due:  Saturday Morning                                   rosuvastatin 10 MG tablet   Commonly known as:  CRESTOR   Take 10 mg by mouth daily   Last time this was given:  10 mg on 10/20/2017  8:48 AM   Next Dose Due:  Saturday Morning                                   SERTRALINE HCL PO   Take 100 mg by mouth  daily   Last time this was given:  100 mg on 10/20/2017  8:45 AM   Next Dose Due:  Saturday Morning                                             More Information        After Total Hip Replacement: Recovering at Home  Whether you re recovering at home or in a rehabilitation facility, you need to protect your new hip. Sit and move the way you were taught in the hospital. Be sure to see your surgeon for scheduled follow-up visits, and return to activity slowly. A total hip replacement is major surgery, so don t be surprised if it takes a few months before you feel really good.    See your surgeon  Post-op visits allow your surgeon to make sure your hip is healing well. Stitches or staples are often taken out about 2 weeks after surgery.  Call 911  Call 911 right away if you have any of the following:    Chest pain    Shortness of breath  When to call your doctor   Call your surgeon or doctor right away if you have any of the following:    Hip pain gets worse    Pain or swelling in your calf or leg not related to your incision    Tenderness or redness in your calf    Fever of 100.4 F (38 C) or higher, or as directed by your healthcare provider    Shaking chills    Swelling or redness at the incision site gets worse    Fluid draining from the incision  Returning to activity  Practice walking daily. Try to do more each week. Start by getting your own glass of water. If the weather is good, walk to the corner to mail a letter. Keep at it--that s the main thing.  Sitting and dressing  To protect your new hip, an occupational therapist or physical therapist will teach you safer ways of doing daily tasks. Use the following tips when sitting, dressing, or using stairs.    To sit, back up until the edge of the chair touches your leg. Then, using the armrests to support your weight, lower yourself into the seat. Always keep your operated leg out in front.    To pull on socks and shoes, use a long-handled device, such as a  grasper or hook. Try this with slip-on shoes first.    To wash your feet and legs, use a long-handled sponge and a shower hose.    To use stairs, step up first with your good leg. Then bring your operated leg up to meet it. When going down, step down first with your operated leg.  Returning to sex  After the incision heals and you regain some hip movement, you may be ready to have sex. Ask your surgeon or the office nurse about when it is safe to start having sex, and what positions are safe.  Maintaining your new joint  An infection in your body could harm the new joint. Talk with your surgeon before scheduling medical or dental procedures. You may need to take antibiotics to prevent infection, even years after your surgery. To check joint stability over time, you may have X-rays every year or two.  Date Last Reviewed: 8/28/2015 2000-2017 [employer]. [address] [place] [address]. All rights reserved. This information is not intended as a substitute for professional medical care. Always follow your healthcare professional's instructions.                After Total Hip Replacement: Returning to Activity  By having a total hip replacement, you re taking the first step to getting back to an active lifestyle. You ll most likely use a walker to get around at first. You may progress to crutches or a cane. You ll be shown how to use your walker or crutches safely. As you recover at home, you ll find yourself returning to your daily routine. Keep doing your exercises. And challenge yourself to walk even farther. Expect to see your efforts pay off as you increase your activity.    Using your skills at home  In the hospital, you practiced getting out of bed, walking, and doing daily tasks safely with your new hip. Once you return home, it s time to use what you ve learned. To keep your hip safe, always think before you move.  Develop a walking program  A good way to practice walking is by making  it part of your daily routine. Once walking becomes easier, follow a walking program. Members of your healthcare team can help you create a walking program that s safe for you.  Extending yourself by walking farther  Slowly increase the amount of walking you do around your home. Getting your own glass of water, going outside for the mail, and doing household chores like dusting are ways to practice walking. As you recover you ll move on to advanced activities, such as using the stairs.  Practice a smooth stride  To move easily, you must walk with a smooth motion. Watch yourself in a mirror while you walk toward it. Or have, someone watch you. Make sure you re walking heel to toe, and with equal weight (and time) on each foot.  Being more active  The key to becoming active is sticking with your recovery program. Talk with your surgeon about the activities that you want to resume. Your surgeon will tell you when and how you can safely return to activities such as sex, swimming, gardening, and driving.  Date Last Reviewed: 8/28/2015 2000-2017 [employer]. [address] [place] [address]. All rights reserved. This information is not intended as a substitute for professional medical care. Always follow your healthcare professional's instructions.                After Hip Replacement: When to Call Your Surgeon    It s important to follow all of your surgeon s instructions after your hip replacement surgery. If you have questions, call your doctor.  Call 911  Call 911 right away if you have any of the following:    Chest pain    Shortness of breath  When to call your doctor   Call your doctor right away if you have any of the following:    Hip pain gets worse    Pain or swelling in your calf or leg not related to your incision    Tenderness or redness in your calf    Fever of 100.4 F (38 C) or higher, or as directed by your healthcare provider    Shaking chills    Swelling or redness at the  incision site gets worse    Fluid draining from the incision  Preventing infections  An infection can harm the new joint. An example of an infection is pneumonia or a bladder infection. Be sure to call your surgeon or primary care doctor if you think you have an infection. Also call if you schedule a medical or dental procedure. You may need to take antibiotics to help prevent infection.  Date Last Reviewed: 11/15/2015    4693-4766 [employer]. [address] [place] [address]. All rights reserved. This information is not intended as a substitute for professional medical care. Always follow your healthcare professional's instructions.                After Hip Replacement: Home Safety  Becoming more aware of hazards in your home can help make your recovery safer. You might want to have furniture rearranged so it s easier to get around. In the bathroom, aids like a shower hose and a raised toilet seat can help you stay safe. Don t forget to watch out for hazards like wet floors, loose or worn rugs, or uneven surfaces.      Date Last Reviewed: 8/28/2015 2000-2017 [employer]. [address] [place] [address]. All rights reserved. This information is not intended as a substitute for professional medical care. Always follow your healthcare professional's instructions.                Diet: Diabetes  Food is an important tool that you can use to control diabetes and stay healthy. Eating well-balanced meals in the correct amounts will help you control your blood glucose levels and prevent low blood sugar reactions. It will also help you reduce the health risks of diabetes. There is no one specific diet that is right for everyone with diabetes. But there are general guidelines to follow. A registered dietitian (RD) will create a tailored diet approach that s just right for you. He or she will also help you plan healthy meals and snacks. If you have any questions, call your  dietitian for advice.     Guidelines for success  Talk with your healthcare provider before starting a diabetes diet or weight loss program. If you haven't talked with a dietitian yet, ask your provider for a referral. The following guidelines can help you succeed:    Select foods from the 6 food groups below. Your dietitian will help you find food choices within each group. He or she will also show you serving sizes and how many servings you can have at each meal.    Grains, beans, and starchy vegetables    Vegetables    Fruit    Milk or yogurt    Meat, poultry, fish, or tofu    Healthy fats    Check your blood sugar levels as directed by your provider. Take any medicine as prescribed by your provider.    Learn to read food labels and pick the right portion sizes.    Eat only the amount of food in your meal plan. Eat about the same amount of food at regular times each day. Don t skip meals. Eat meals 4 to 5 hours apart, with snacks in between.    Limit alcohol. It raises blood sugar levels. Drink water or calorie-free diet drinks that use safe sweeteners.    Eat less fat to help lower your risk of heart disease. Use nonfat or low-fat dairy products and lean meats. Avoid fried foods. Use cooking oils that are unsaturated, such as olive, canola, or peanut oil.    Talk with your dietitian about safe sugar substitutes.    Avoid added salt. It can contribute to high blood pressure, which can cause heart disease. People with diabetes already have a risk of high blood pressure and heart disease.    Stay at a healthy weight. If you need to lose weight, cut down on your portion sizes. But don t skip meals. Exercise is an important part of any weight management program. Talk with your provider about an exercise program that s right for you.    For more information about the best diet plan for you, talk with a registered dietitian (RD). To find an RD in your area, contact:    Academy of Nutrition and Dietetics  www.eatright.org    The American Diabetes Association [phone] www.diabetes.org  Date Last Reviewed: 8/1/2016 2000-2017 [employer]. [address] [place] [address], [place] [address]. All rights reserved. This information is not intended as a substitute for professional medical care. Always follow your healthcare professional's instructions.                Managing Type 1 Diabetes    Diabetes is a long-term chronic condition. Managing your diabetes means making some changes that may be hard. Your healthcare provider, nurse, diabetes educator, and others can help you.  Managing type 1 diabetes means balancing your insulin with diet and activity. You will have to check your blood sugar and at times, ketones. You will also have to work with your healthcare provider to prevent complications.  Inject your insulin  You will need to inject insulin. Or, you may have an insulin pump. The insulin moves the sugar in your blood into your cells.  Insulin comes in several different types, depending on how quickly it begins working and how long the effect lasts. There is also insulin that is a combination of more than one type of insulin. Your healthcare provider, nurse, or a diabetes educator can help you with injections.  Make sure you use insulin as instructed by your healthcare provider. He or she may change the type, timing, or dose, if your blood sugar is not well controlled.  And, make sure your insulin is stored correctly and is not past the expiration date.  Eat healthy  A healthy, well-planned diet helps to control the amount of sugar in your blood. It also helps you stay at a healthy weight.  Your healthcare provider, nurse, a dietitian, or diabetes educator will help you create a plan that works for you. You don't have to give up all the foods you like. Having meals and snacks with vegetables, fruits, lean meats, or other healthy proteins, whole grains, and low or no-fat dairy products will help control  your blood sugar.   Be physically active  Being active helps your body use insulin to turn food into energy.  Ask your healthcare provider to work with you to create an activity program that's right for you. Your activity program is based on your age, general health, and types of activity that you enjoy. Start slowly, but aim for at least 30 minutes of exercise or activity on most days.  Monitor your blood sugar  Your healthcare provider will give you instructions about checking your blood sugar at home. Checking it tells you if your blood sugar is in your target range. Having blood sugar levels in your target range means that you are managing your diabetes well.  Your healthcare provider will tell you what is too high and too low for you. Call your healthcare provider if your blood sugar is out of that range. Know how to recognize and respond quickly to symptoms of low blood sugar (such as sweating, trembling, or confusion).  Your healthcare provider may also tell you to check your blood sugar more often when you are sick. At certain times, for example, when you have a cold or the flu, you may need to check it more often.  If your blood sugar levels are often too high or too low, your healthcare provider may suggest changes to your diet or activity level. He or she may also adjust your medicine.  Check for ketones  You may sometimes need to check your urine for ketones. Ketones are chemicals that are produced when fat, instead of glucose, is burned for energy (ketosis). To check for ketones, follow instructions that come with the strips and from your healthcare provider, nurse, or diabetes educator. If ketones are present, always call your healthcare provider right away. Some people also use home glucose monitors to check the blood for ketosis. Ask your healthcare provider, nurse, or diabetes educator for more information.  Take care of yourself  When you have diabetes, you may be more likely to develop other  health problems. They include foot, eye, heart, and kidney problems. By controlling your blood sugar, and taking good care of yourself, you can help to prevent these problems. Your healthcare provider, nurse, diabetes educator, and others can help you.    Checkups. You should have regular checkups with your healthcare provider. At those visits, you will have a physical exam that includes checking your feet. Your healthcare provider will also check your blood pressure and weight.    Other exams. You should also have complete eye, foot, and dental exams at least once every year.    Lab tests. You will have blood and urine tests.     At least two times a year, your healthcare provider will check your hemoglobin A1C. This blood test shows how well you have been controlling your blood sugar over 2 to 3 months. The results help your healthcare provider manage your diabetes.    You will also have other lab tests. For example, to check for kidney problems and abnormal cholesterol levels.    Smoking. If you smoke, you must quit. Smoking increases the chance that you will develop complications from diabetes. Ask your healthcare provider about ways to quit.    Vaccines. Get a yearly flu shot. And, ask your healthcare provider about vaccines to prevent pneumonia and hepatitis B.  Stress and depression  Most people have challenges throughout their lives. Living with diabetes, or any serious condition, can increase your stress and make you feel a lot of different emotions. In diabetes, feeling stressed or depressed can actually affect your blood sugar levels.  If you are having trouble dealing with diabetes, tell your healthcare provider. He or she can help or refer you to other healthcare providers or programs.  Support and resources  Know where you can get help. You can try the following:    Support. Ask family and friends to support your efforts to take care of yourself. Or look for a diabetes support group locally or on the  Internet. (Check the Connect with Others on www.diabetes.org.)    Counseling. Talk with a , psychologist, psychiatrist, or other counselor.    Information. Contact the American Diabetes Association at 560-033-7869 or www.diabetes.org.  Date Last Reviewed: 6/1/2016 2000-2017 [employer]. [address] [place] [address], [place] [address]. All rights reserved. This information is not intended as a substitute for professional medical care. Always follow your healthcare professional's instructions.                Managing Stress When You Have Diabetes    Getting used to life with a chronic condition can be hard. You might find yourself feeling angry, sad, or even afraid. Rest assured, these feelings are normal. But excess stress or sadness can actually affect your blood sugar. Learn to watch for signs of these feelings. And know that you can get help.  Talking with your healthcare team  Learning to control blood sugar can sometimes be frustrating. You may have questions or fears about how diabetes may change your life. Your healthcare team is there to help you and answer questions. They can show you how to follow your meal plan, be more active, and check your blood sugar. Don t be afraid to ask your healthcare team for help.  Asking others for help  You don t have to deal with diabetes alone. Support from family, friends, or a diabetes support group can help you take better care of yourself. Ask others to:    Listen to your feelings. This will help you work through fear or anger.    Eat the same meals you eat. Your meal plan will be healthy for family and friends, too.    Exercise with you. Exercise is good for everyone. It strengthens the heart and helps relieve stress.    Go with you to visit your healthcare team. This will help your loved ones learn what you need to do.  Taking time to relax  Learning to relax and doing things you enjoy may reduce stress. Staying active also helps.  Ways to  relax  To relax your muscles and calm your mind:    Sit or lie back in a chair. Take a slow, deep breath. Hold it for 5 counts. Then breathe out slowly through the mouth. Keep doing this until you feel relaxed.    As you breathe deeply, tense and then relax the muscles in your body. Start with your feet and work up your body to your neck and face.    Picture yourself in a peaceful place, such as the beach. Feel the warm sand. Hear the waves. Smell the ocean. Doing this will help you feel more relaxed.  Activities that can help  Focus your mind on things you like. This may include:    Enjoying a hobby    Meditating or praying    Taking a walk with a friend    Exercising    Taking care of pets    Keeping a journal    Joining a social club or group    Volunteering with a community organization     Learning yoga or rohan chi    Spending time with people you care about  Recognize depression  Many people feel sad or down when they first hear that they have diabetes. But frequent feelings of helplessness or hopelessness are symptoms of depression. Although depression is a serious problem, it can be treated. If you are having trouble sleeping or eating, or if you feel overwhelmed, contact your healthcare provider. Don t wait! Get the support you need to feel good about managing diabetes.   Date Last Reviewed: 6/1/2016 2000-2017 [employer]. [address] [place] [address], [place] [address]. All rights reserved. This information is not intended as a substitute for professional medical care. Always follow your healthcare professional's instructions.                Diabetes: Meal Planning    You can help keep your blood sugar level in your target range by eating healthy foods. Your healthcare team can help you create a low-fat, nutritious meal plan. Take an active role in your diabetes management by following your meal plan and working with your healthcare team.  Make your meal plan  A meal plan gives guidelines for the  types and amounts of food you should eat. The goal is to balance food and insulin (or other diabetes medications) so your blood sugars will be in your target range. Your dietitian will help you make a flexible meal plan that includes many foods that you like.  Watch serving sizes  Your meal plan will group foods by servings. To learn how much a serving is, start by measuring food portions at each meal. Soon you ll know what a serving looks like on your plate. Ask your healthcare provider about how to balance servings of different foods.  Eat from all the food groups  The basis of a healthy meal plan is variety (eating lots of different foods). Choose lean meats, fresh fruits and vegetables, whole grains, and low-fat or nonfat dairy products. Eating a wide variety of foods provides the nutrients your body needs. It can also keep you from getting bored with your meal plan.  Learn about carbohydrates, fats, and protein    Carbohydrates are starches, sugars, and fiber. They are found in many foods, including fruit, bread, pasta, milk, and sweets. Of all the foods you eat, carbohydrates have the most effect on your blood sugar. Your dietitian may teach you about carb counting, a way to figure out the number of carbohydrates in a meal.    Fats have the most calories. They also have the most effect on your weight and your risk of heart disease. When you have diabetes, it s important to control your weight and protect your heart. Foods that are high in fat include whole milk, cheese, snack foods, and desserts.    Protein is important for building and repairing muscles and bones. Choose low-fat protein sources, such as fish, egg whites, and skinless chicken.  Reduce liquid sugars  Extra calories from sodas, sports drinks, and fruit drinks make it hard to keep blood sugar in range. Cut as many liquid sugars from your meal plan as you can.  This includes most fruit juices, which are often high in natural or added sugar.  Instead, drink plenty of water and other sugar-free beverages.  Eat less fat  If you need to lose weight, try to reduce the amount of fat in your diet. This can also help lower your cholesterol level to keep blood vessels healthier. Cut fat by using only small amounts of liquid oil for cooking. Read food labels carefully to avoid foods with unhealthy trans fats.  Timing your meals  When it comes to blood sugar control, when you eat is as important as what you eat. You may need to eat several small meals spaced evenly throughout the day to stay in your target range. So don t skip breakfast or wait until late in the day to get most of your calories. Doing so can cause your blood sugar to rise too high or fall too low.   Date Last Reviewed: 3/1/2016    0113-2088 [employer]. [address] [place] [address], [place] [address]. All rights reserved. This information is not intended as a substitute for professional medical care. Always follow your healthcare professional's instructions.                Understanding Type 2 Diabetes  When your body is working normally, the food you eat is digested and used as fuel. This fuel supplies energy to the body s cells. When you have diabetes, the fuel can t enter the cells. Without treatment, diabetes can cause serious long-term health problems.     Your body breaks down the food you eat into glucose.   How the body gets energy  The digestive system breaks down food, resulting in a sugar called glucose. Some of this glucose is stored in the liver. But most of it enters the bloodstream and travels to the cells to be used as fuel. Glucose needs the help of a hormone called insulin to enter the cells. Insulin is made in the pancreas. It is released into the bloodstream in response to the presence of glucose in the blood. Think of insulin as a key. When insulin reaches a cell, it attaches to the cell wall. This signals the cell to create an opening that allows glucose to enter the  cell.      When you have type 2 diabetes  Early in type 2 diabetes, your cells don t respond properly to insulin. Because of this, less glucose than normal moves into cells. This is called insulin resistance. In response, the pancreas makes more insulin. But eventually, the pancreas can t produce enough insulin to overcome insulin resistance. As less and less glucose enters cells, it builds up to a harmful level in the bloodstream. This is known as high blood sugar or hyperglycemia. The result is type 2 diabetes. The cells become starved for energy, which can leave you feeling tired and rundown.  Why high blood sugar is a problem  If high blood sugar is not controlled, blood vessels throughout the body become damaged. Prolonged high blood sugar affects organs, blood vessels, and nerves. As a result, the risks of damage to the heart, kidneys, eyes, and limbs increase. Diabetes also makes other problems, such as high blood pressure and high cholesterol, more dangerous. Over time, people with uncontrolled high blood sugar have an increase in risk of dying of, or being disabled by, heart attack or stroke.  Date Last Reviewed: 7/1/2016 2000-2017 [employer]. [address] [place] [address]. All rights reserved. This information is not intended as a substitute for professional medical care. Always follow your healthcare professional's instructions.                Diabetes: Caring for Your Body    When you have diabetes, your body needs special care. This care helps you stay healthy and prevent complications. Exercise and healthy eating are a part of this. You can also protect yourself by taking special care of your feet, skin, and teeth.  Caring for your feet  Follow these tips to help keep your feet healthy:    Check your feet every day for redness, blisters, cracks, dry skin, or numbness. Use a mirror to inspect the bottoms of your feet, if needed. Or, ask for help.    Wash your feet in warm  (not hot) water. Don t soak them.    Use an emery board to keep your toenails even with the ends of your toes. File away sharp edges. A podiatrist (foot doctor) may need to cut your toenails for you.    Keep your skin soft and smooth by placing a thin layer of skin lotion on the tops and bottoms of your feet. Do not put lotion in between your toes.    Always wear shoes or slippers, even inside your home. Make sure that shoes are properly fitted. Change your socks daily.    Call your healthcare provider right away if your feet are numb or painful, or if a cut or sore doesn t heal within a few days.  Preventing skin infections  To prevent skin infections, bathe every day. Dry yourself well, especially between your toes. Wash any cuts with warm, soapy water and cover with a sterile bandage. Call your healthcare provider if a cut or sore doesn't heal in a few days, feels warm, itches, or has a bad odor.  Caring for your teeth  Follow these guidelines for healthy teeth:    Brush your teeth twice daily.    Floss your teeth daily.    See your dentist at least twice yearly.  If you smoke, quit  Smoking is dangerous for everyone, especially people with diabetes. It can harm the blood vessels in your eyes, kidneys, and heart. It raises blood pressure. Smoking can also slow healing, so infections are more likely. Ask your healthcare provider about programs to help you stop smoking.   Date Last Reviewed: 3/1/2016    7006-7320 [employer]. [address] [place] [address], [place] [id number]. All rights reserved. This information is not intended as a substitute for professional medical care. Always follow your healthcare professional's instructions.                Diabetes: Inspecting Your Feet    Diabetes increases your chances of developing foot problems. So inspect your feet every day. This helps you find small skin irritations before they become serious ulcers or infections. If you have trouble seeing the bottoms of your  feet, use a mirror or ask a family member or friend to help.  How to check your feet  Below are tips to help you look for foot problems. Try to check your feet at the same time each day, such as when you get out of bed in the morning:    Check the top of each foot. The tops of toes, back of the heel, and outer edge of the foot can get a lot of rubbing from poor-fitting shoes.    Check the bottom of each foot. Daily wear and tear often leads to problems at pressure spots.    Check the toes and nails. Fungal infections often occur between toes. Toenail problems can also be a sign of fungal infections or lead to breaks in the skin.    Check your shoes, too. Loose objects inside a shoe can injure the foot. Use your hand to feel inside your shoes for things like trip, loose stitching, or rough areas that could irritate your skin.  Warning signs  Look for any color changes in the foot. Redness with streaks can signal a severe infection, which needs immediate medical attention. Tell your healthcare provider right away if you have any of these problems:    Swelling, sometimes with color changes, may be a sign of poor blood flow or infection. Symptoms include tenderness and an increase in the size of your foot.    Warm or hot areas on your feet may be signs of infection. A foot that is cold may not be getting enough blood.    Sensations such as burning, tingling, or  pins and needles  can be signs of a problem. Also check for areas that may be numb.    Hot spots are caused by friction or pressure. Look for hot spots in areas that get a lot of rubbing. Hot spots can turn into blisters, calluses, or sores.    Cracks and sores are caused by dry or irritated skin. They are a sign that the skin is breaking down, which can lead to infection.    Toenail problems to watch for include nails growing into the skin (ingrown toenail) and causing redness or pain. Thick, yellow, or discolored nails can signal a fungal  infection.    Drainage and odor can develop from untreated sores and ulcers. Call your healthcare provider right away if you notice white or yellow drainage, bleeding, or unpleasant odor.   Date Last Reviewed: 6/1/2016 2000-2017 [employer]. [address] [place] [address], [place] [address]. All rights reserved. This information is not intended as a substitute for professional medical care. Always follow your healthcare professional's instructions.                Diabetes: Keeping Feet Healthy     Have your feet checked every time you see your healthcare provider, and at least once a year.     Diabetes can damage nerves in your feet and cause neuropathy. This condition makes it hard for you to feel injuries or sore spots. Diabetes can also change blood flow, making it harder for small problems, like a blister, to heal properly. In fact, minor injuries can quickly become serious infections that send you to the hospital. Practice self-care to protect your feet and keep them healthy.   Take special care  Here are tips for taking special care of your feet:    Inspect your feet daily for problems such as redness, blisters, cracks, dry skin, or numbness. Use a mirror to see the bottoms of your feet. Or, ask for help.    Manage your diabetes. Monitor and control your blood sugar. Take all your medicines as prescribed.    Avoid walking barefoot, even indoors. Always wear socks inside your shoes.     Wash your feet with warm water and mild soap. Dry well, especially between toes.    Don t treat corns or calluses yourself. Talk to your healthcare provider or podiatrist (a healthcare provider who specializes in foot care) if you need assistance trimming your toenails.    Use moisturizing cream or lotion if you have dry skin, but don t use it between toes.    Don t use heating pads on your feet. If you have neuropathy, you could get a burn and not feel it.    Stop smoking. Smoking restricts blood flow and can make it  harder for wounds to heal.    Don't use sharp blades to trim your nails. Use a nail clipper and file instead.   Have regular checkups  Foot problems can develop quickly. So be sure to follow your healthcare team s schedule for regular checkups. During office visits, take off your shoes and socks as soon as you get in the exam room. Ask your healthcare provider to examine your feet for problems. This will make it easier to find and treat small skin irritations before they get worse. Regular checkups can also help keep track of the blood flow and feeling in your feet. If you have neuropathy, you may need to have checkups more often.  Wear proper footwear  Wearing proper footwear is very important. If areas of your feet have been damaged by too much pressure, your healthcare provider may recommend changing your footwear. In some cases, avoiding high heels or tight work boots may be all that s needed. Or, your healthcare provider may recommend special shoes or custom inserts. These help protect your feet and keep existing irritations from getting worse. If you need special footwear, ask your healthcare provider if you qualify for Medicare's custom-molded and extra-depth diabetic shoe and insert program.   Make sure shoes and socks fit  Any pair of shoes--new or old--should feel comfortable as soon as you put them on. There shouldn t be any rubbing when you walk. Wear the right shoe for any activity. For instance, a running shoe is designed to keep your feet injury-free while jogging. Buy shoes at the end of the day, when your feet are larger. Make sure they provide support without feeling too loose. Make sure your socks fit, too. Wear soft, seamless, well-padded socks for activity. Cotton or microfiber socks are best to help to absorb sweat. To protect your feet, avoid shoes that are open-toed or open-heeled. If you have questions about what kinds of shoes and socks are best, talk to your healthcare team.  Get regular  exercise  Regular exercise improves blood flow in your feet. It also increases foot strength and flexibility. Gentle exercises, like walking or riding a stationary bicycle, are best. You can also do special foot exercises. Just be sure to talk with your healthcare provider before starting any exercise program. Also mention if any exercise causes pain, redness, or other signs of foot problems.     Note: If you have any kind of break in the skin of your foot or ankle, keep the area clean. Then call your healthcare provider--especially if the area doesn t appear to be healing.   Date Last Reviewed: 6/1/2016 2000-2017 [employer]. [address] [place] [address]. All rights reserved. This information is not intended as a substitute for professional medical care. Always follow your healthcare professional's instructions.                Diabetes: Getting Started with Exercise    Getting started is easier than you think. Simple and small movements can get you started on a regular exercise routine. You don t need to join a gym to start moving. Choose an activity you enjoy. Start slowly and set small goals. Work activity into your daily life. Talk to your healthcare provider before starting an activity program. You may need to have a checkup before you begin.  Start with movement  If you re not used to being active, start with gentle movements while you watch TV. Raise your arms and legs while seated. Then repeat for 5 to 10 minutes. With time, add some slow walking. Even taking a flight of stairs instead of the elevator can lift you to healthier heights. These types of brief activities are great ways to get started. They can help lower your blood sugar level, strengthen your heart, and improve your energy.  Steps toward being more active  Your goal, especially at first, is to keep your activity simple. Slowly work up to 30 minutes of activity a day. But you don t need to do it all at once. You can  be active in 3, 10-minute sessions a day. You can also combine being active with the other things you need to do. For instance, stand up from your desk and walk around often when at work. Or, go for a walk around the mall before you shop.  Keep your activity simple  Why make activity hard on yourself? Choose things that you like to do and that fit into your schedule. Here are some tips:    Get off the bus a stop or two early and walk the rest of the way.    Run small shopping errands on your bike.    Go for a 10-minute walk after each meal.    Park your car in the space farthest from where you re going.    Get a pedometer that records the number of steps you take. Make a goal for the number of steps you take each day. Increase your goal a little each week.  Keep your activity safe  Safety tips include the following:     Be sure to warm up before you start and cool down when you re done.    Carry or wear identification, such as a necklace or bracelet, that says that you have diabetes.    Carry a cell phone with you in case you need to call for assistance.    Stay well hydrated before, during, and after your exercise.      Eat 1 to 2 hours before you exercise, if instructed.    Check your blood sugar before and after you exercise, if instructed. Check your blood sugar if you feel symptoms.    Carry fast-acting sugar with you in case you have low blood sugar.    Wear socks and well-fitting shoes. Check your feet for blisters or redness after the exercise.     Think about the weather in your area. At times, you may need to choose indoor rather than outdoor activities.    Stop the activity if you feel any pain, shortness of breath, or light-headedness.  Make your activity fun  Mix fitness with fun. The more fun you have, the more likely you are to stick to your plan. You can have a better blood sugar level along with an active, fun day. Try these hints:    Choose an exercise that you enjoy and can do easily.    Join a  social club that goes for walks or does other physical activities.    Go bird watching or do something else that gets you outdoors.    Put on some music and dance.    Involve your family or friends in your physical activity.  Date Last Reviewed: 5/1/2016 2000-2017 [employer]. [address] [place] [address], [place] [address]. All rights reserved. This information is not intended as a substitute for professional medical care. Always follow your healthcare professional's instructions.                Diabetes: The Benefits of Exercise     Take the stairs whenever you can.   Exercise can lower blood sugar, help control weight, and boost your mood. It can also improve blood flow, lower blood pressure, help you use oxygen more efficiently, and improve heart health. Even a small amount of regular activity can have a big impact on your health.  What can exercise help?    Blood sugar. Regular exercise improves blood sugar control by helping your body use insulin.    Mental and emotional health. Physical activity relieves stress and helps you sleep better.    Heart health. With regular exercise, you can reduce your risk of heart disease and high blood pressure. You can also improve your cholesterol and triglyceride levels.    Weight. Exercise helps you lose fat, gain muscle, and control your weight.    Health of blood vessels and nerves. Activity helps lower blood sugar. This helps prevent damage to blood vessels and nerves that can cause problems with your brain, eyes, feet, and legs.    Finances. If you manage your blood sugar, you may spend less on medical care.  2 types of exercise  Two types of exercise help your body use blood sugar. Experts advise both types of exercise for people with diabetes:    Aerobic exercise. This is a rhythmic, repeated, continued movement of large muscle groups for at least 10 minutes at a time. You should do this about 30 minutes a day on most days of the week. Examples include  walking, bicycling, jogging, swimming, water aerobics, and many sports.    Resistance exercise (strength training). This type of exercise uses muscles to move weight or work against resistance. You can do it with free weights, machines, resistance tubing, or your own body weight. Adults with diabetes should aim for 2 to 3 sessions of resistance exercise each week. It s best to skip a day in between.  A goal to shoot for  Your main goal is to become more active. Even a little bit helps. Choose an activity that you like. Walking is one great form of exercise that everyone can do. Talk to your healthcare provider about any limits you may have before starting with an exercise program. Then aim for 150 minutes a week of physical activity. Don t let more than 2 days go by without exercise. When you are sitting for long periods of time, get up for short sessions of light activity every 30 minutes.  Getting activity into your day  Being more active doesn t have to be hard work. Try these to get more activity into your day:    Take the stairs instead of the elevator    Garden, do housework, and yard work    Choose a parking space farther from the store    Walk to talk to a co-worker instead of calling    Take a 10-minute walk around the block at lunch    Walk to a bus stop a little farther from your home or office    Walk the dog after dinner  Date Last Reviewed: 5/1/2016 2000-2017 [employer]. [address] [place] [id number]. All rights reserved. This information is not intended as a substitute for professional medical care. Always follow your healthcare professional's instructions.                High Blood Sugar (Hyperglycemia)     When you have hyperglycemia, drink plenty of water or other sugar-free, caffeine-free liquids.   Too much glucose (sugar) in your blood is called hyperglycemia or high blood sugar. High blood sugar can lead to a dangerous condition called ketoacidosis. In severe  cases, it can lead to dehydration and coma.  Possible causes of hyperglycemia    Inadequate treatment plan for diabetes     Being sick    Being under stress    Taking certain medicines, such as steroids    Eating too much food, especially carbohydrates    Being less active than usual    Not taking enough diabetes medicine  Symptoms of hyperglycemia  Hyperglycemia may not cause symptoms. If you do have symptoms, they may include:    Thirst    Frequent need to urinate    Feeling tired    Nausea and vomiting    Itchy, dry skin    Blurry vision    Fast breathing and breath that smells fruity     Weakness    Dizziness    Wounds or skin infections that don t heal    Unexplained weight loss if hyperglycemia lasts for more than a few days   What you should do  Make sure you do the following:    Check your blood sugar.    Drink plenty of sugar-free, caffeine-free liquids such as water. Don t drink fruit juice.    Check your blood sugar again every 4 hours. If you take insulin or diabetes medicines, follow your sick-day plan for taking medicine. Call your healthcare provider if you are not able to eat.    Check your blood or urine for ketones as directed.    Call your healthcare provider if your blood sugar and ketones do not return to your target range.  Preventing high blood sugar  To help keep your blood sugar from getting too high:    Control stress.    When you're ill, follow your sick-day plan.     Follow your meal plan. Eat only the amount of food on your meal plan    Follow your exercise plan.    Take your insulin or diabetes medicines as directed by your healthcare team. Also test your blood sugar as directed. If the plan is not working for you, discuss it with your healthcare provider.  Other things to do    Carry a medical ID card, a compact USB drive, or wear a medical alert bracelet or necklace. It should say that you have diabetes. It should also say what to do in case you pass out or go into a coma.    Make  sure family, friends, and coworkers know the signs of high blood sugar. Tell them what to do if your blood sugar gets very high and you can t help yourself.    Talk to your healthcare team about other things you can do to prevent high blood sugar.   Special note: Drink plenty of sugar-free and caffeine-free liquids when you feel symptoms of hyperglycemia. Call your healthcare provider if you keep having episodes of hyperglycemia.   Date Last Reviewed: 5/1/2016 2000-2017 [employer]. [address] [place] [address], [place] [address]. All rights reserved. This information is not intended as a substitute for professional medical care. Always follow your healthcare professional's instructions.                Managing Type 2 Diabetes    Type 2 diabetes is a long-term (chronic) condition. Managing your diabetes means making some changes that may be hard. Your healthcare provider, nurse, diabetes educator, and others can help you.  Managing type 2 diabetes means balancing your medicine with diet and activity. Managing your diabetes may also include checking your blood sugar. And, working with your healthcare provider to prevent complications.  Take your medicine as prescribed  You may take pills (oral medicine) or give yourself shots (usually insulin injections) for diabetes. Or you may use both. Taking your medicines or giving yourself insulin at the right times will help you control your blood sugar. Think about ways that will help you remember to take your medicines the right way every day. Ask your healthcare provider, nurse, or diabetes educator for ideas.  Although you may only take pills for your diabetes, this may change. Over time, most people with type 2 diabetes also use insulin.  Eat healthy  A healthy, well-planned diet helps control the amount of sugar in your blood. It also helps you stay at a healthy weight or lose weight, if you are overweight. Extra weight makes it harder to control  diabetes.  Your healthcare provider, nurse, a dietitian, or diabetes educator will help you create a plan that works for you. You don't have to give up all the foods you like. Having meals and snacks with vegetables, fruits, lean meats, or other healthy proteins, whole grains, and low-fat or nonfat dairy products will help control your blood sugar.  Be physically active  Being active helps lower your blood sugar. It does this by helping your body use insulin to turn food into energy. Activity also helps you manage your weight:  Ask your health care provider to work with you to create an activity program that's right for you. Your activity program is based on your age, general health, and types of activity you enjoy. You should start slowly, but aim for at least 150 minutes of exercise or activity. Don t let more than 2 days go by without being active.  Check your blood sugar  Checking your own blood sugar may be a regular part of your care. Or you may only check your blood sugar from time to time. Your healthcare provider will give you instructions about checking your blood sugar at home. Checking it tells you if your blood sugar is in your target range. Having your blood sugars within the target range means that you are managing your diabetes well.  If your blood sugar levels are too high or too low, your healthcare provider may suggest changes to your diet or activity level. He or she may also adjust your medicine.  Your healthcare provider may also tell you to check your blood sugar more often when you are sick. At certain times, for example, when you have a cold or the flu, you may need to check it more often.  Take care of yourself  When you have diabetes, you may be more likely to develop other health problems. They include foot, eye, heart, and kidney problems. By controlling your blood sugar, and taking good care of yourself, you can help prevent these problems. Your health care provider, nurse, diabetes  educator, and others can assist you:    Checkups. You should have regular checkups with your healthcare provider. At those visits, you will have a physical exam that includes checking your feet. Your healthcare provider will also check your blood pressure and weight.    Other exams. You should also have complete eye, foot, and dental exams at least once every year.    Lab tests. You will have blood and urine tests:    At least two times a year, your healthcare provider will check your hemoglobin A1C. This blood test shows how well you have been controlling your blood sugar over 2 to 3 months. The results help your healthcare provider manage your diabetes.      You will also have other lab tests. For example, to check for kidney problems and abnormal cholesterol levels.    Smoking. If you smoke, you will need to quit. Smoking increases the chance that you will develop complications from diabetes. Ask your healthcare provider about ways to quit.    Vaccines. Get a yearly flu shot. And, ask your healthcare provider about vaccines to prevent pneumonia, shingles, and hepatitis B.  Stress and depression  Most people have challenges throughout their lives. Living with diabetes, or any serious condition, can increase your stress and make you feel a lot of different emotions. In diabetes, feeling stressed or depressed can actually affect your blood sugar levels.  If you are having trouble dealing with diabetes, tell your healthcare provider. He or she can help or refer you to other healthcare providers or programs.  Support and resources  Know where you can get help. You can try the following:    Support. Ask family and friends to support your effects to take care of yourself. Or, look for a diabetes support group locally or on the internet. (Check the Connect with Others link on www.diabetes.org)    Counseling. Talk with a , psychologist, psychiatrist, or other counselor.    Information. Contact the American  Diabetes Association at 217-159-3918 or www.diabetes.org  Date Last Reviewed: 6/1/2016 2000-2017 [employer]. [address] [place] [address], [place] [address]. All rights reserved. This information is not intended as a substitute for professional medical care. Always follow your healthcare professional's instructions.                Understanding Carbohydrates    A car needs the right type of fuel to run. And you need the right kind of food to function. To keep your energy level up, your body needs food that has carbohydrates. But carbohydrates raise blood sugar levels higher and faster than other kinds of food. Your dietitian will work with you to figure out the amount of carbohydrates you need.  Starches  Starches are found in grains, some vegetables, and beans. Grain products include bread, pasta, cereal, and tortillas. Starchy vegetables include potatoes, peas, corn, lima beans, yams, and squash. Kidney beans, luis beans, black beans, garbanzo beans, and lentils also contain starches.  Sugars  Sugars are found naturally in many foods. Or sugar can be added. Foods that contain natural sugar include fruits and fruit juices, dairy products, honey, and molasses. Added sugars are found in most desserts, processed foods, candy, regular soda, and fruit drinks. These are very helpful for treating low blood sugar, or hypoglycemia. They provide sugar quickly. Try to keep at least 15 to 20 grams of these simple sugars with you at all times. Eat this if you begin to have low blood sugar symptoms.  Fiber  Fiber comes from plant foods. Most fiber isn t digested by the body. Instead of raising blood sugar levels like other carbohydrates, it actually stops blood sugar from rising too fast. Fiber is found in fruits, vegetables, whole grains, beans, peas, and many nuts.  Carb counting  Keep track of the amount of carbohydrates you eat. This can help you keep the right balance of physical activity and medicine. The  amount of carbohydrates needed will vary for each person. It depends on many things such as your health, the medicines you take, and how active you are. Your healthcare team will help you figure out the right amount of carbohydrates for you. You may start with around 45 to 60 grams of carbohydrate per meal, depending on your situation. Carb counting is a system that helps you keep track of the carbohydrates you eat at each meal.  Carbohydrates come from a variety of foods. These include grains, starchy vegetables, fruit, milk, beans, and snack foods. You can either count carbohydrate grams or carbohydrate servings. When you count carbohydrate servings, 1 carbohydrate serving = 15 grams of carbohydrates.  Here are some examples of foods containing about 15 grams of carbohydrates (1 serving of carbohydrates):    1/2 cup of canned or frozen fruit    A small piece of fresh fruit (4 ounces)    1 slice of bread    1/2 cup of oatmeal    1/3 cup of rice    4 to 6 crackers    1/2 English muffin    1/2 cup of black beans    1/4 of a large baked potato (3 ounces)    2/3 cup of plain fat-free yogurt    1 cup of soup    1/2 cup of casserole    6 chicken nuggets    2-inch-square brownie or cake without frosting    2 small cookies    1/2 cup of ice cream or sherbet  Carb counting is easier when food labels are available. Look at the label to see how many grams of total carbohydrates the food contains. Then you can figure out how much you should eat.  Two very important lines to look at on the label are the serving size and the total carbohydrate amount. Here are some tips for using food labels to count your carbohydrate intake:    Check the serving size. The information on the label is based on that serving size. If you eat more than the listed serving size, you may have to double or triple the other information on the label.     Check the total grams of carbohydrates. Total carbohydrate from the label includes sugar, starch, and  fiber. Be sure to use the total carbohydrate number and not sugar alone.    Know how many grams of carbohydrates you can have.  Be familiar with the matching portion sizes.    Compare labels. Compare the labels of different products, looking at serving sizes and total carbohydrates to find the products that work best for you.     Don't forget protein and fat. With all the focus on carb counting, it might be easy to forget protein and fat in your meals. Don't forget to include sources of protein and healthy fat to balance your meals.  It s also important to be consistent with the amount and time you eat when taking a fixed dose of diabetes medicine. Work with your healthcare provider or dietitian if you need additional help. He or she can help you keep track of your carbohydrate intake. He or she can also help you figure out how many grams of carbohydrates you should have.  Date Last Reviewed: 7/1/2016 2000-2017 [employer]. [address] [place] [address], [place] [address]. All rights reserved. This information is not intended as a substitute for professional medical care. Always follow your healthcare professional's instructions.                Diabetes: Living Your Life    Having diabetes may mean changes at work and in your social life. But these changes need not keep you from doing well at work and enjoying your free time.  Family and friends  Your family and friends may have questions about diabetes. They may have a hard time understanding why you need to make changes in your life. Urge them to learn about diabetes with you. Spend time with friends who support you in taking good care of yourself.  Special occasions  Parties and holidays often mean more or different food and drink. You can still enjoy these events:    At parties, focus on enjoying music, dancing, or talking to friends.    Bring a snack or dish that works well for you. The other guests might appreciate low-calorie versions of their  favorite foods.     Before the next holiday, learn how to fit traditional foods into your meal plan.    Religion holidays may mean fasting or other changes in the way you eat. Talk to your healthcare provider, your dietitian, and your clergy about how you can observe holidays safely.   Work  Lunch meetings, shift changes, or business travel may affect diabetes management.    Make diabetes a priority. If your work schedule changes often or you find it hard to manage your daily tasks, talk to your healthcare provider and your employer.    You may need to make special arrangements to do your daily tasks, such as checking your blood sugar.    As long as you can do your job safely, your employer can t discriminate against you because of your health.  Tell your healthcare provider if you re feeling helpless or hopeless, or are having trouble sleeping or eating. These are symptoms of depression, a serious but treatable problem.   Date Last Reviewed: 5/1/2016 2000-2017 [employer]. [address] [place] [address]. All rights reserved. This information is not intended as a substitute for professional medical care. Always follow your healthcare professional's instructions.                Your Diabetes Toolkit    Do you find it hard to keep track of your supplies? Make it easy by creating a diabetes toolkit. Find a small makeup or travel bag. Then fill it with what you need to care for your diabetes. The list of supplies below can help you get started.  What to include in your toolkit  Suggestions include the following:    Extra insulin, syringes, pens, or insulin pump supplies    Other medicines you take for diabetes-related problems    Copies of all prescriptions    The pharmacy label that came with your insulin (this is needed for air travel)    Fast-acting sugar, such as glucose tablets, for hypoglycemia    Glucagon for severe hypoglycemia, when you can't safely swallow     A blood glucose  meter, lancing device, test strips, and a log book    Extra batteries for your meter    An ID card that says you have diabetes and lists emergency contact numbers  Date Last Reviewed: 6/1/2016 2000-2017 [employer] [address] [place] [address], [place] [address]. All rights reserved. This information is not intended as a substitute for professional medical care. Always follow your healthcare professional's instructions.                Planning for Travel When You Have Diabetes    Taking care of your diabetes means developing a routine for things like meals, exercising, and taking medicine. But sometimes this routine is disrupted when you travel. Your healthcare team can help you work out a plan to prepare for unexpected situations. The tips below can help.  When you travel  During your trip, stick to your meal and exercise plans as much as you can:    Wear an ID necklace or bracelet that says you have diabetes.    Keep your diabetes kit with you, not in your luggage.    Pack double the supplies you think you will need. Try not to put them all in the same bag.    On train, bus, or airplane trips, take a walk in the aisle at least every 2 hours.    Always carry a source of fast-acting sugar with you, such as glucose tablets or hard candies, but not sugar-free candy.    Carry extra snacks, such as crackers, cheese, or fruit, in case meals are delayed.    Drink plenty of water, especially when traveling by air.    If you re traveling across more than two time zones, ask your healthcare provider how to adjust your medicine or insulin schedule.    If you are traveling by air, do not remove the prescription label from the insulin bottles and supplies. TSA security agents may want to see these to allow you to carry them through security check. Let the TSA screeners know if you are wearing an insulin pump before going through body scanners or personal screenings.   Be prepared  Tips to being prepared while  traveling:    Keep a diabetes kit. It should include your blood glucose meter, batteries, test strips, lancing device, fast-acting sugar, extra medicine, syringes if needed, and copies of prescriptions. Use a case designed to carry diabetes supplies. Or use a makeup case, a belt pouch, or briefcase.    Take your diabetes kit with you everywhere, just like you take your wallet and keys. Try to find a case for your insulin that is heat-resistant if you will be visiting a warm region.     Wear a bracelet or necklace that says you have diabetes.    Store supplies at work as well as at home.    Carry your healthcare provider s phone number with you.  Date Last Reviewed: 6/1/2016 2000-2017 [employer]. [address] [place] [address], [place] [address]. All rights reserved. This information is not intended as a substitute for professional medical care. Always follow your healthcare professional's instructions.                Eating Out When You Have Diabetes  Eating right is an important part of keeping your blood sugar in your target range. You just need to make healthy choices.    Tips for restaurant meals  When you eat away from home try these tips:    Try to schedule your dining-out meal at your normal meal time. Make a reservation if possible, so you don't have to wait to eat. If you can't make a reservation, try to arrive at the restaurant at a less-busy time to cut down your wait time. Eat a small fruit or starch snack at your regular mealtime if your restaurant meal is going to be later than usual.     Call ahead to see if the restaurant can meet your dietary needs if you've never been there before. Or you can go online to see the menu ahead of time.    Carry some crackers with you in case the restaurant needs you to wait until you can be served.    Ask how foods are prepared before you order.    Instead of fried, sautéed, or breaded foods, choose ones that are broiled, steamed, grilled, or baked.    Ask  for sauces, gravies, and dressings on the side.    Only eat an amount that fits your meal plan. Remember: You can take home the leftovers.    Save dessert for special occasions. Then choose a small dessert or share one with a friend or family member.  Make healthy choices  Fast food    Garden salad with light dressing on the side    Baked potato with vegetables or herbs    Broiled, roasted, or grilled chicken sandwich    Sliced turkey or lean roast beef sandwich  Mexican    Chicken enchilada, without cheese or sour cream     Small burrito with whole beans and chicken    Whole beans (not refried) and rice    Chicken or fish fajitas  Steakhouse    Grilled or broiled lean cuts of beef    Baked potato with vegetables or herbs    Broiled or baked chicken. Don t eat the skin.    Steamed vegetables  Asian    Steamed dumplings or potstickers    Broiled, boiled, or steamed meats or fish    Sushi or sashimi    Steamed rice or boiled noodles. One serving is equal to 1/3 cup.  Date Last Reviewed: 6/1/2016 2000-2017 [employer]. [address] [place] [id number]. All rights reserved. This information is not intended as a substitute for professional medical care. Always follow your healthcare professional's instructions.

## 2017-10-18 NOTE — CONSULTS
CARDIOLOGY CONSULTATION      DATE OF SERVICE:  10/18/2017      REQUESTING PHYSICIAN:  Iva Fuentes MD      REASON FOR CONSULTATION: Cardiology consult requested for questionable Mobitz type II block during monitoring in PACU.      REASON FOR CONSULTATION:  None.      HISTORY OF PRESENT ILLNESS:  Mr. Dougie Portillo is a very pleasant 72-year-old gentleman with history of hypertension, dyslipidemia who was on lisinopril and metoprolol and hydrochlorothiazide and Crestor, underwent elective left total hip arthroplasty today.  Cardiology is now consulted because of concern about possible Mobitz type II block seen during PACU recovery and some EKG changes.  To be noted, at the time of this review, the patient is entirely asymptomatic.  He is feeling well and is watching TV in his bed with his wife at bedside.  The patient tells me that he never had any chest discomfort, either today or before the surgery or any time in the past.  I looked at the available rhythm strip in his chart.  The rhythm is sinus with what looks like probable PVC followed by a P-wave.  This does not appear typical for Mobitz as the P-waves are not marching out.  This could potentially be a PVC followed by a blocked P-wave.  Unfortunately, only 1 rhythm strip is available.  A 12-lead EKG was personally reviewed by me, shows sinus rhythm with bifascicular block.  Overall, the EKG, in terms of bifascicular block and T-wave inversion in lead III, looks similar to the previous EKG done earlier this month.  There is some subtle ST depression in lead V2, V3 which probably is because of the bifascicular block.  Again, as noted, no chest discomfort.  The patient tells me that he does not recall any particular episode of dizziness or passing out or near passing out.  [place], his knees suddenly gave-way and he almost fell, but he is very clear that he never felt dizzy, lightheaded or passed out.  No  chest discomfort with physical activity.  He also denies any exertional shortness of breath.  He recently had an echocardiogram that done that showed LVEF of 55% with mildly dilated ascending aorta measuring 3.8 cm.  He also has underlying chronic kidney disease, stage III, with baseline creatinine around 1.3.      REVIEW OF SYSTEMS:  A complete review of systems was performed and was negative except as above in the HPI.      PAST MEDICAL HISTORY:  Hypertension, dyslipidemia, chronic kidney disease, sleep apnea on CPAP, osteoarthritis.      FAMILY HISTORY:  Not clear-cut.  He does not know his family history.  Questionable congestive heart failure in one of his brothers.  No known coronary artery disease in his first-degree relatives.      SOCIAL HISTORY:  No tobacco abuse.  Occasional alcohol intake.      ALLERGIES:  No known drug allergies.      MEDICATIONS PRIOR TO ADMISSION:   1.  Metoprolol tartrate 50 mg b.i.d.   2.  Lisinopril 10 mg daily.   3.  Hydrochlorothiazide 25 mg daily.   4.  Crestor 10 mg daily.   5.  Baby aspirin.      PHYSICAL EXAMINATION:   VITAL SIGNS:  Blood pressure 122/59, heart rate 53, 14 respiratory rate, 100% on room air.   GENERAL:  The patient appears pleasant, comfortable.   NECK:  Normal JVP, no bruit.   CARDIOVASCULAR:  S1, S2 normal.  No murmur, rub or gallop.   RESPIRATORY:  Clear to auscultation bilaterally.   GASTROINTESTINAL:  Abdomen soft, nontender.   EXTREMITIES:  No pitting pedal edema.   NEUROLOGIC:  Alert, oriented x3.   PSYCH:  Normal affect.   SKIN:  No obvious rash.   HEENT:  No pallor or icterus.      EKG:  Personally reviewed by me, compared to previous EKG.  One rhythm strip available in the chart, also personally reviewed as noted above.      IMPRESSION AND PLAN:  A pleasant 72-year-old gentleman with history of hypertension, dyslipidemia, chronic kidney disease who underwent elective total hip arthroplasty with some concerns about possible Mobitz type II AV block  during recovery and some nonspecific EKG changes in the absence of any symptoms.  The 1 available rhythm strip is hard to evaluate.  It does not appear clear-cut Mobitz type II block as P-waves are not marching.  There are probably premature ventricular contractions or maybe even an artifact, possibly followed by a blocked P-wave.  There is no clear-cut history of any dizziness or presyncope or syncope in the past.  At this time, I recommend continuing telemetry as you are.  I recommend staying away from beta blocker for now.  Regarding the EKG changes, they appear quite nonspecific, probably in the setting of bifascicular block, and the patient does not clinically appear to be in any acute coronary syndrome.  We will follow along with you, and we will review the telemetry to see if any concerning arrhythmia is noted.      Thank you for involving me in the care of Mr. Campa.  Cardiology will continue to follow.  Plan was discussed with the patient and his family.         COREY MELO MD             D: 10/18/2017 16:26   T: 10/18/2017 17:30   MT:       Name:     JESSEE CAMPA   MRN:      [id number]        Account:       [id number]   :      1945           Consult Date:  10/18/2017      Document: [id number]

## 2017-10-18 NOTE — PROGRESS NOTES
- spoke with Dr. Fuentes: ok to discontinue IV fluids containing dextrose now given patient has borderline diabetes with . IV can be saline locked as he is able to tolerate PO fluids.

## 2017-10-18 NOTE — PLAN OF CARE
Problem: Patient Care Overview  Goal: Plan of Care/Patient Progress Review  Outcome: Improving  Pt arrived at 1400 to floor. Room orientation education completed. Scant drainage on drsg. No c/o pain. Clear liquid. Abnormalities on  EKG, hospitalist consulted. Pt placed on tele. Report given to 3rd floor shawn shift RN. Pt to be transferred to [facility].

## 2017-10-18 NOTE — BRIEF OP NOTE
[facility] Brief Operative Note    Pre-operative diagnosis: DJD right hip   Post-operative diagnosis * No post-op diagnosis entered *     Procedure: Procedure(s):  Total hip Arthroplasty Right  2 hours - Wound Class: I-Clean   Surgeon(s): Surgeon(s) and Role:     * Jose Caldwell MD - Primary   Estimated blood loss: * No values recorded between 10/18/2017 10:04 AM and 10/18/2017 10:51 AM *    Specimens: * No specimens in log *   Findings:

## 2017-10-18 NOTE — IP AVS SNAPSHOT
[facility]    [address]    [facility] [address]    Phone:  [phone]    Fax:  [phone]                                       After Visit Summary   10/18/2017    Dougie Portillo    MRN: [id number]           After Visit Summary Signature Page     I have received my discharge instructions, and my questions have been answered. I have discussed any challenges I see with this plan with the nurse or doctor.    ..........................................................................................................................................  Patient/Patient Representative Signature      ..........................................................................................................................................  Patient Representative Print Name and Relationship to Patient    ..................................................               ................................................  Date                                            Time    ..........................................................................................................................................  Reviewed by Signature/Title    ...................................................              ..............................................  Date                                                            Time

## 2017-10-18 NOTE — PLAN OF CARE
While in PACU patient experienced 3-5 abnormal telemetry rhythms.  All were Mobitz Type II of 1 to 2 beats.  One strip captured in PACU and mounted for chart.  Pt. Also states he has had lightheaded/dizzy spells over the last month.  Pt. Asymptomatic while in PACU.    Pt did have preop EKG and ECHO both in epic.    MDA Dr. Butterfield and Dr. Perry looked at rhythm and would like to have hospitalist made aware of rhythm changes and place patient on telemetry while in hospital.    [facility] made aware of rhythm changes and MDA recommendation.  She states she will make hospitalist aware of need for telemetry.

## 2017-10-18 NOTE — ANESTHESIA CARE TRANSFER NOTE
Patient: Dougie Portillo    Procedure(s):  Total hip Arthroplasty Right  2 hours - Wound Class: I-Clean    Diagnosis: DJD right hip  Diagnosis Additional Information: End stage arthritis of right hip  Dr. Caldwell    Anesthesia Type:   General, ETT     Note:  Airway :Face Mask  Patient transferred to:PACU  Handoff Report: Identifed the Patient, Identified the Reponsible Provider, Reviewed the pertinent medical history, Discussed the surgical course, Reviewed Intra-OP anesthesia mangement and issues during anesthesia, Set expectations for post-procedure period and Allowed opportunity for questions and acknowledgement of understanding      Vitals: (Last set prior to Anesthesia Care Transfer)    CRNA VITALS  10/18/2017 1039 - 10/18/2017 1117      10/18/2017             Pulse: 66    SpO2: 98 %    Resp Rate (observed): 20                Electronically Signed By: AMBER Burgos CRNA  October 18, 2017  11:17 AM

## 2017-10-18 NOTE — ANESTHESIA PREPROCEDURE EVALUATION
Anesthesia Evaluation     . Pt has had prior anesthetic. Type: General    No history of anesthetic complications          ROS/MED HX    ENT/Pulmonary:     (+)sleep apnea, uses CPAP , . .    Neurologic:  - neg neurologic ROS     Cardiovascular:     (+) Dyslipidemia, hypertension----. : . . . :. . Previous cardiac testing Echodate:2017results:Normal ventricular and valvular functiondate: results: date: results: date: results:          METS/Exercise Tolerance:     Hematologic:  - neg hematologic  ROS       Musculoskeletal:   (+) arthritis, , , -       GI/Hepatic:  - neg GI/hepatic ROS       Renal/Genitourinary:     (+) Other Renal/ Genitourinary, hx of kidney donation      Endo: Comment: Class 2 obesity  prediabeties    (+) Obesity, .      Psychiatric:     (+) psychiatric history depression      Infectious Disease:  - neg infectious disease ROS       Malignancy:      - no malignancy   Other:    - neg other ROS                 Physical Exam  Normal systems: cardiovascular, pulmonary and dental    Airway   Mallampati: II  TM distance: >3 FB  Neck ROM: full    Dental     Cardiovascular   Rhythm and rate: regular and normal      Pulmonary    breath sounds clear to auscultation                    Anesthesia Plan      History & Physical Review  History and physical reviewed and following examination; no interval change.    ASA Status:  3 .    NPO Status:  > 8 hours    Plan for General and ETT with Intravenous induction. Maintenance will be Balanced.    PONV prophylaxis:  Ondansetron (or other 5HT-3) and Dexamethasone or Solumedrol       Postoperative Care  Postoperative pain management:  IV analgesics, Oral pain medications and Multi-modal analgesia.      Consents  Anesthetic plan, risks, benefits and alternatives discussed with:  Patient.  Use of blood products discussed: No .   .                          .

## 2017-10-18 NOTE — CONSULTS
Hospitalist Consultation      Dougie Portillo MRN# [id number]   YOB: 1945 Age: 72 year old   Date of Admission: 10/18/2017     Requesting Physician: Dr. Caldwell  Reason for consult: Medical Management           Assessment and Plan:    Mr. Portillo is a  73 y/o male with PMH sig for HTN, HLD, Sleep apnea and Boderline DM  Admitted for elective total hip arthroplasty. Hospitalist team was consulted as patient was noted to have heart block while monitored postoperatively and for medical management.    Abnormal EKG  -- tele strip from PACU with ? 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 Patient underwent procedure today while in PACU post op he was noted to have Morbitz type 2 block. Patient Denies any chest pain, no lightheadedness at this time, no palpitations, no sob or diaphoresis. However intermittently patient has had episode of lightheadedness  Unlike his typical vertigo occasionally notices dyspnea on exertion when mows  his lawn and exerts himself. Denies any symptoms at this time. HAs had a stress test several years ago. As work up for surgery underwent echo which was normal but was told he may have to watch for DM. Review of all other systems negative.              Past Medical History:     Past Medical History:   Diagnosis Date     DJD (degenerative joint disease)     hip     High cholesterol      Hypertension      Renal disease     Kidney donor to brother     Sleep apnea     Uses a CPAP             Past Surgical History:     Past Surgical History:   Procedure Laterality Date     AMPUTATION       BIOPSY      Testicle ? side--was negative.     NEPHRECTOMY RT/LT Left     Donated kidney to brother               Social History:     Social History   Substance Use Topics     Smoking status: Never Smoker     Smokeless tobacco: Never Used     Alcohol use Yes      Comment: Occas   Alcohol rarely on occasiona         Family History:   History reviewed. No pertinent family history.   SIGNIFICANT for DM and HTN          Allergies:   No Known Allergies          Medications:     Prescriptions Prior to Admission   Medication Sig Dispense Refill Last Dose     METOPROLOL TARTRATE PO Take 50 mg by mouth 2 times daily   10/18/2017 at Unknown time     LISINOPRIL PO Take 10 mg by mouth daily   10/18/2017 at Unknown time     HYDROCHLOROTHIAZIDE PO Take 25 mg by mouth daily   10/17/2017 at Unknown time     SERTRALINE HCL PO Take 100 mg by mouth daily   10/17/2017 at Unknown time     rosuvastatin (CRESTOR) 10 MG tablet Take 10 mg by mouth daily   10/17/2017 at Unknown time     IBUPROFEN PO Take 400 mg by mouth every 6 hours as needed for moderate pain    at week ago     ASPIRIN PO Take 81 mg by mouth daily    at week ago               Review of Systems:   A comprehensive greater than 10 system review of systems was carried out.  Pertinent positives and negatives are noted above.  Otherwise negative for contributory info.            Physical Exam:  "  Vitals were reviewed  Blood pressure 121/66, pulse 58, temperature 96.5  F (35.8  C), temperature source Oral, resp. rate 14, height 1.854 m (6' 1\"), weight 109.8 kg (242 lb), SpO2 100 %.  Exam:   GENERAL:  Comfortable.  PSYCH: pleasant, oriented, No acute distress.  EYES: PERRLA, Normal conjunctiva.  HEART:  Normal S1, S2 with no edema.  LUNGS:  Clear to auscultation, normal Respiratory effort.  ABDOMEN:  Soft, no hepatosplenomegaly, normal bowel sounds.  SKIN:  Dry to touch, No rash.  Neuro: AAOX3, CN's intact. Sensation and Reflexes intact.           Data:   Past 24 hours labs, studies, and imaging were reviewed.  All laboratory data reviewed  All laboratory and imaging data in the past 24 hours reviewed  "

## 2017-10-18 NOTE — ANESTHESIA POSTPROCEDURE EVALUATION
Patient: Dougie Portillo    Procedure(s):  Total hip Arthroplasty Right  2 hours - Wound Class: I-Clean    Diagnosis:DJD right hip  Diagnosis Additional Information: End stage arthritis of right hip  Dr. Caldwell    Anesthesia Type:  General, ETT    Note:  Anesthesia Post Evaluation    Patient location during evaluation: PACU  Patient participation: Able to fully participate in evaluation  Level of consciousness: awake  Pain management: adequate  Airway patency: patent  Cardiovascular status: acceptable  Respiratory status: acceptable  Hydration status: acceptable  PONV: none             Last vitals:  Vitals:    10/18/17 1145 10/18/17 1150 10/18/17 1153   BP: 117/71 115/67 115/67   Pulse:      Resp: 13 12 13   Temp: 98.2  F (36.8  C)     SpO2: 100% 100% 99%         Electronically Signed By: Roger Alford MD  October 18, 2017  12:31 PM

## 2017-10-19 ENCOUNTER — APPOINTMENT (OUTPATIENT)
Dept: PHYSICAL THERAPY | Facility: CLINIC | Age: 72
DRG: 470 | End: 2017-10-19
Attending: ORTHOPAEDIC SURGERY
Payer: MEDICARE

## 2017-10-19 ENCOUNTER — APPOINTMENT (OUTPATIENT)
Dept: OCCUPATIONAL THERAPY | Facility: CLINIC | Age: 72
DRG: 470 | End: 2017-10-19
Attending: ORTHOPAEDIC SURGERY
Payer: MEDICARE

## 2017-10-19 LAB
ANION GAP SERPL CALCULATED.3IONS-SCNC: 6 MMOL/L (ref 3–14)
BASOPHILS # BLD AUTO: 0 10E9/L (ref 0–0.2)
BASOPHILS NFR BLD AUTO: 0.1 %
BUN SERPL-MCNC: 22 MG/DL (ref 7–30)
CALCIUM SERPL-MCNC: 8.3 MG/DL (ref 8.5–10.1)
CHLORIDE SERPL-SCNC: 100 MMOL/L (ref 94–109)
CO2 SERPL-SCNC: 30 MMOL/L (ref 20–32)
CREAT SERPL-MCNC: 1.13 MG/DL (ref 0.66–1.25)
DIFFERENTIAL METHOD BLD: ABNORMAL
EOSINOPHIL # BLD AUTO: 0 10E9/L (ref 0–0.7)
EOSINOPHIL NFR BLD AUTO: 0.1 %
ERYTHROCYTE [DISTWIDTH] IN BLOOD BY AUTOMATED COUNT: 13.2 % (ref 10–15)
GFR SERPL CREATININE-BSD FRML MDRD: 64 ML/MIN/1.7M2
GLUCOSE BLDC GLUCOMTR-MCNC: 117 MG/DL (ref 70–99)
GLUCOSE BLDC GLUCOMTR-MCNC: 124 MG/DL (ref 70–99)
GLUCOSE BLDC GLUCOMTR-MCNC: 133 MG/DL (ref 70–99)
GLUCOSE BLDC GLUCOMTR-MCNC: 157 MG/DL (ref 70–99)
GLUCOSE SERPL-MCNC: 153 MG/DL (ref 70–99)
HCT VFR BLD AUTO: 36.6 % (ref 40–53)
HGB BLD-MCNC: 12.1 G/DL (ref 13.3–17.7)
IMM GRANULOCYTES # BLD: 0.1 10E9/L (ref 0–0.4)
IMM GRANULOCYTES NFR BLD: 0.5 %
INTERPRETATION ECG - MUSE: NORMAL
LYMPHOCYTES # BLD AUTO: 1.5 10E9/L (ref 0.8–5.3)
LYMPHOCYTES NFR BLD AUTO: 10 %
MCH RBC QN AUTO: 28.4 PG (ref 26.5–33)
MCHC RBC AUTO-ENTMCNC: 33.1 G/DL (ref 31.5–36.5)
MCV RBC AUTO: 86 FL (ref 78–100)
MONOCYTES # BLD AUTO: 1.1 10E9/L (ref 0–1.3)
MONOCYTES NFR BLD AUTO: 7.4 %
NEUTROPHILS # BLD AUTO: 12.2 10E9/L (ref 1.6–8.3)
NEUTROPHILS NFR BLD AUTO: 81.9 %
NRBC # BLD AUTO: 0 10*3/UL
NRBC BLD AUTO-RTO: 0 /100
PLATELET # BLD AUTO: 219 10E9/L (ref 150–450)
POTASSIUM SERPL-SCNC: 3.6 MMOL/L (ref 3.4–5.3)
RBC # BLD AUTO: 4.26 10E12/L (ref 4.4–5.9)
SODIUM SERPL-SCNC: 136 MMOL/L (ref 133–144)
TROPONIN I SERPL-MCNC: <0.015 UG/L (ref 0–0.04)
WBC # BLD AUTO: 14.9 10E9/L (ref 4–11)

## 2017-10-19 PROCEDURE — 25000132 ZZH RX MED GY IP 250 OP 250 PS 637: Mod: GY | Performed by: ORTHOPAEDIC SURGERY

## 2017-10-19 PROCEDURE — A9270 NON-COVERED ITEM OR SERVICE: HCPCS | Mod: GY | Performed by: ORTHOPAEDIC SURGERY

## 2017-10-19 PROCEDURE — 99231 SBSQ HOSP IP/OBS SF/LOW 25: CPT | Performed by: INTERNAL MEDICINE

## 2017-10-19 PROCEDURE — 84484 ASSAY OF TROPONIN QUANT: CPT | Performed by: INTERNAL MEDICINE

## 2017-10-19 PROCEDURE — 97530 THERAPEUTIC ACTIVITIES: CPT | Mod: GP | Performed by: PHYSICAL THERAPIST

## 2017-10-19 PROCEDURE — 36415 COLL VENOUS BLD VENIPUNCTURE: CPT | Performed by: INTERNAL MEDICINE

## 2017-10-19 PROCEDURE — 25000128 H RX IP 250 OP 636: Performed by: ORTHOPAEDIC SURGERY

## 2017-10-19 PROCEDURE — 12000000 ZZH R&B MED SURG/OB

## 2017-10-19 PROCEDURE — 97535 SELF CARE MNGMENT TRAINING: CPT | Mod: GO | Performed by: STUDENT IN AN ORGANIZED HEALTH CARE EDUCATION/TRAINING PROGRAM

## 2017-10-19 PROCEDURE — 97161 PT EVAL LOW COMPLEX 20 MIN: CPT | Mod: GP | Performed by: PHYSICAL THERAPIST

## 2017-10-19 PROCEDURE — 97110 THERAPEUTIC EXERCISES: CPT | Mod: GP | Performed by: PHYSICAL THERAPIST

## 2017-10-19 PROCEDURE — 85025 COMPLETE CBC W/AUTO DIFF WBC: CPT | Performed by: INTERNAL MEDICINE

## 2017-10-19 PROCEDURE — 40000193 ZZH STATISTIC PT WARD VISIT: Performed by: PHYSICAL THERAPIST

## 2017-10-19 PROCEDURE — 99233 SBSQ HOSP IP/OBS HIGH 50: CPT | Performed by: INTERNAL MEDICINE

## 2017-10-19 PROCEDURE — 97165 OT EVAL LOW COMPLEX 30 MIN: CPT | Mod: GO | Performed by: STUDENT IN AN ORGANIZED HEALTH CARE EDUCATION/TRAINING PROGRAM

## 2017-10-19 PROCEDURE — 97116 GAIT TRAINING THERAPY: CPT | Mod: GP | Performed by: PHYSICAL THERAPIST

## 2017-10-19 PROCEDURE — 00000146 ZZHCL STATISTIC GLUCOSE BY METER IP

## 2017-10-19 PROCEDURE — 80048 BASIC METABOLIC PNL TOTAL CA: CPT | Performed by: INTERNAL MEDICINE

## 2017-10-19 PROCEDURE — 40000133 ZZH STATISTIC OT WARD VISIT: Performed by: STUDENT IN AN ORGANIZED HEALTH CARE EDUCATION/TRAINING PROGRAM

## 2017-10-19 RX ORDER — OXYCODONE HYDROCHLORIDE 5 MG/1
5-10 TABLET ORAL
Qty: 30 TABLET | Refills: 0 | Status: SHIPPED | OUTPATIENT
Start: 2017-10-19 | End: 2017-10-20

## 2017-10-19 RX ADMIN — CEFAZOLIN SODIUM 2 G: 2 INJECTION, SOLUTION INTRAVENOUS at 03:09

## 2017-10-19 RX ADMIN — ACETAMINOPHEN 325 MG: 325 TABLET, FILM COATED ORAL at 17:42

## 2017-10-19 RX ADMIN — LISINOPRIL 10 MG: 10 TABLET ORAL at 08:48

## 2017-10-19 RX ADMIN — HYDROCHLOROTHIAZIDE 25 MG: 25 TABLET ORAL at 08:47

## 2017-10-19 RX ADMIN — OXYCODONE HYDROCHLORIDE 10 MG: 5 TABLET ORAL at 12:49

## 2017-10-19 RX ADMIN — OXYCODONE HYDROCHLORIDE 5 MG: 5 TABLET ORAL at 17:42

## 2017-10-19 RX ADMIN — SERTRALINE HYDROCHLORIDE 100 MG: 100 TABLET ORAL at 08:48

## 2017-10-19 RX ADMIN — ACETAMINOPHEN 325 MG: 325 TABLET, FILM COATED ORAL at 08:47

## 2017-10-19 RX ADMIN — OXYCODONE HYDROCHLORIDE 10 MG: 5 TABLET ORAL at 00:42

## 2017-10-19 RX ADMIN — RIVAROXABAN 10 MG: 10 TABLET, FILM COATED ORAL at 08:47

## 2017-10-19 RX ADMIN — INSULIN ASPART 1 UNITS: 100 INJECTION, SOLUTION INTRAVENOUS; SUBCUTANEOUS at 09:08

## 2017-10-19 RX ADMIN — OXYCODONE HYDROCHLORIDE 5 MG: 5 TABLET ORAL at 21:07

## 2017-10-19 RX ADMIN — ROSUVASTATIN CALCIUM 10 MG: 5 TABLET, FILM COATED ORAL at 08:47

## 2017-10-19 ASSESSMENT — ACTIVITIES OF DAILY LIVING (ADL)
ADLS_ACUITY_SCORE: 11

## 2017-10-19 NOTE — OP NOTE
DATE OF PROCEDURE:  10/18/2017      PREOPERATIVE DIAGNOSIS:  Degenerative joint disease, right hip.      POSTOPERATIVE DIAGNOSIS:  Degenerative joint disease, right hip.      PROCEDURE:  Right total hip arthroplasty.      ASSISTANT:   ASA Ortiz, ERROL, CORI      ANESTHESIA:  General.      INDICATIONS:  Dougie Portillo is a 72-year-old man who has had chronic increasing problems with right hip pain and has failed a nonoperative treatment program.  Appropriate risks and alternatives have been discussed at length, and he has elected to proceed with surgical intervention.      DESCRIPTION OF PROCEDURE:  The patient was brought to the operating room, given a general anesthetic, positioned left side down and right hip area prepped and draped sterilely in the usual manner.  We made our standard mini lateral incision.  Dissection was carried down sharply to the fascia, fascia was split in line with its fibers.  The anterior one-half of the abductors dissected free off the greater trochanter, capsule opened and the hip dislocated anteriorly.  Inspection of the joint revealed complete loss of articular cartilage.  We osteotomized the femoral neck at an appropriate level.      Attention was turned to the acetabulum.  Progressively larger reamers were used until we sized for a 60 mm cup.  The 60 mm R3 cup was driven into position, 45 degrees of inclination and 30 degrees of anteversion.  Excellent fixation was achieved.      Attention was turned back to the femur.  Progressively larger reamers and broaches were used until we sized for a #16 component high offset.  Trial reduction was done and with a -3 neck length, 36 mm head and a 20 degree acetabular liner with the build up in the posterior superior position we had excellent restoration of leg length with full range of motion and no impingement or instability.      Hip was redislocated and trial components removed.  We placed the real 20 degree build-up liner in the  superior posterior position.  We drove the real femoral component.  We retrialed the head and again chose a -3 36 mm diameter Oxinium head.  This was driven onto the C taper.  Final reduction again gave excellent range of motion with excellent restoration of leg length.      Wound was irrigated copiously with antibiotic solution.  Hemostasis was obtained by electrocautery.  Closure done in layers, closing the abductors back to greater trochanter with #1 Vicryl, fascia with #1 Vicryl, subcutaneous tissue with 2-0 Vicryl, Insorb on the skin.  Sterile compressive dressing applied.  The patient awakened and taken to the recovery room in good condition.  There were no intraoperative complications and minimal blood loss.         IRVIN VILLAREAL MD             D: 10/18/2017 10:56   T: 10/18/2017 20:15   MT: LAUREN#[id number]      Name:     JESSEE CAMPA   MRN:      [id number]        Account:        [id number]   :      1945           Procedure Date: 10/18/2017      Document: [id number]       cc: Irvin Villareal MD

## 2017-10-19 NOTE — PLAN OF CARE
"Problem: Hip Arthroplasty (Total, Partial) (Adult)  Goal: Signs and Symptoms of Listed Potential Problems Will be Absent, Minimized or Managed (Hip Arthroplasty)  Signs and symptoms of listed potential problems will be absent, minimized or managed by discharge/transition of care (reference Hip Arthroplasty (Total, Partial) (Adult) CPG).   Outcome: Improving  /61 (BP Location: Right arm)  Pulse 58  Temp 98.2  F (36.8  C) (Oral)  Resp 18  Ht 1.854 m (6' 1\")  Wt 109.8 kg (242 lb)  SpO2 98%  BMI 32.82 kg/m2     POD#1 s/p Right BERRY. Dressing with dried drainage. Tele: SR with PVC's, BBB-cleared by cardiology. PT eval today-up with assist x1. Surgeon here to see pt-pt to transfer to [facility] (ortho) and to dc tomorrow or Saturday. , 117. PRN oxycodone for pain. Diet advanced to mod CHO and cardiac-tolerating.       "

## 2017-10-19 NOTE — PROGRESS NOTES
"[facility]  Cardiology Progress Note    Date of Service: 10/19/2017       Patient being seen by cardiology in follow up of possible Mobitz type II block post total hip arthroplasty.     HPI:   Dougie Portillo is a 72 year old male with past medical history significant for ROSANNA on CPAP, hypertension, hypercholesterolemia, CKD (baseline SCr around 1.3) who was admitted on 10/18/2017 for an elective left total hip arthroplasty. While recovering in the PACU there was a question of a Mobitz type II block. He was asymptomatic in regard to this. Review of strip showed possible PVC followed by a blocked P wave, and 12 lead ECG showed sinus rhythm with bifasicular block, which was similar to preoperative ECG by report. Most recent echocardiogram ([facility]) a few weeks ago showed EF 55%, with mildly dilated ascending aorta 3.8cm.  His home BB was held, and telemetry continued. Per patient, he states he's had \"an occasional irregular beat\" in the past noted while in the hospital, but never felt to be an issue for him.     Interval history:  Telemetry overnight shows sinus rhythm, no new events.  Serial troponins negative.   Electrolytes and renal fx stable, remains on IVF.   On Xarelto post op DVT prophylaxis.   BP well controlled off BB, remains on home HCTZ and lisinopril.   Compliant with nightly CPAP.    Discussed with available staff at bedside, chart reviewed, patient seen and examined.      ASSESSMENT/PLAN:    1.  Possible Mobitz type II AV block post op, asymptomatic.      --Remains asymptomatic, NSR on tele with no events overnight.    --Off beta blocker with acceptable HR and BP.    --Continue tele while in house.    --Troponons negative, no formal ischemic workup needed at this time. Recent echo with EF 55%, mildly dilated aorta.         Patient Active Problem List   Diagnosis     S/P total hip arthroplasty       Subjective:  \"I feel great, actually. When can I go home?\"  A bit tired this AM but overall " "slept well. Wears his home CPAP at night.   No CP, palpitations. Denies dizziness or BARAKAT when up ambulating this AM.      Objective:  Vital signs:  /61 (BP Location: Right arm)  Pulse 58  Temp 98.2  F (36.8  C) (Oral)  Resp 18  Ht 1.854 m (6' 1\")  Wt 109.8 kg (242 lb)  SpO2 98%  BMI 32.82 kg/m2    Intake/Output Summary (Last 24 hours) at 10/19/17 0929  Last data filed at 10/19/17 0659   Gross per 24 hour   Intake             3037 ml   Output             1175 ml   Net             1862 ml       Wt Readings from Last 4 Encounters:   10/16/17 109.8 kg (242 lb)       PE:  Gen: In general, this is a well nourished  male, resting in bed,  in no acute distress on RA. Sitting up in chair my visit.  Neck: Supple with midline trachea. No significant JVD identified with patient upright.  CV: Regular rate and rhythm without murmur or rub appreciated.   Resp:Respirations are unlabored without use of accessory muscles. Auscultation of the lungs reveals clear lung fields bilaterally without wheezes or rhonchi.   GI: Soft, nontender, nondistended. BS present, no hepatosplenomegaly appreciated.   Extrem: Extremities are warm, without cyanosis or clubbing. No lower extremity edema.   Neuro: Patient is alert, oriented, and cooperative. No obvious focal abnormalities.    Current Medications:?    sodium chloride (PF)  3 mL Intracatheter Q8H     hydrochlorothiazide (HYDRODIURIL) tablet 25 mg  25 mg Oral Daily     lisinopril (PRINIVIL/ZESTRIL) tablet 10 mg  10 mg Oral Daily     rosuvastatin  10 mg Oral Daily     sertraline (ZOLOFT) tablet 100 mg  100 mg Oral Daily     sodium chloride (PF)  3 mL Intracatheter Q8H     rivaroxaban ANTICOAGULANT  10 mg Oral Daily     acetaminophen  325 mg Oral Q8H     insulin aspart  1-3 Units Subcutaneous TID AC     insulin aspart  1-3 Units Subcutaneous At Bedtime         Labs/Imaging/Additional testing:  The following labs and imaging were reviewed during today's visit:      Recent " Labs  Lab 10/19/17  0210 10/18/17  2250 10/18/17  1822   TROPI <0.015 <0.015 <0.015         Recent Labs  Lab 10/19/17  0645 10/19/17  0210 10/18/17  2250 10/18/17  1822 10/18/17  1518   WBC 14.9*  --   --   --  12.0*   HGB 12.1*  --   --   --  13.0*   MCV 86  --   --   --  86     --   --   --  211     --   --   --  137   POTASSIUM 3.6  --   --   --  3.4   CHLORIDE 100  --   --   --  100   CO2 30  --   --   --  31   BUN 22  --   --   --  23   CR 1.13  --   --   --  1.31*   GFRESTIMATED 64  --   --   --  54*   GFRESTBLACK 77  --   --   --  65   ANIONGAP 6  --   --   --  6   DIO 8.3*  --   --   --  8.2*   *  --   --   --  202*   TROPI  --  <0.015 <0.015 <0.015  --        Tele:  Reviewed: NSR with BBB unchanged  ??  Chen Wyatt PA-C  [facility]  Pager [phone]

## 2017-10-19 NOTE — PHARMACY
Per nursing request, patient was educated by pharmacist regarding diabetes information and blood glucose monitoring.   Patient verbalized understanding of materials given to him and spoke of an appointment in November with a dietician to address his concerns with his diet.

## 2017-10-19 NOTE — PROGRESS NOTES
10/19/17 0800   Quick Adds   Type of Visit Initial PT Evaluation   Living Environment   Lives With spouse   Living Arrangements house   Number of Stairs to Enter Home 2  (no rail)   Number of Stairs Within Home 14  (rail on L)   Transportation Available car;family or friend will provide   Living Environment Comment Pt is retired as his wife, wife will be able to help at home as needed   Self-Care   Usual Activity Tolerance good   Current Activity Tolerance moderate   Equipment Currently Used at Home none  (has SEC and FWW at home to use)   Functional Level Prior   Ambulation 0-->independent   Transferring 0-->independent   Toileting 0-->independent   Bathing 0-->independent   Dressing 0-->independent   Eating 0-->independent   Communication 0-->understands/communicates without difficulty   Swallowing 0-->swallows foods/liquids without difficulty   Cognition 0 - no cognition issues reported   Fall history within last six months yes   Number of times patient has fallen within last six months 2   Which of the above functional risks had a recent onset or change? ambulation   Prior Functional Level Comment IND with functional mobility and ADLs   General Information   Onset of Illness/Injury or Date of Surgery - Date 10/18/17   Referring Physician Jose Caldwell MD   Patient/Family Goals Statement to go home   Pertinent History of Current Problem (include personal factors and/or comorbidities that impact the POC) Pt is a  71 y/o male with PMH sig for HTN, HLD, Sleep apnea and Boderline DM  Admitted for elective total hip arthroplasty. Hospitalist team was consulted as patient was noted to have heart block while monitored postoperatively and for medical management   Precautions/Limitations no known precautions/limitations   Weight-Bearing Status - RLE weight-bearing as tolerated   General Info Comments Activity: Ambulate with assist    Cognitive Status Examination   Orientation orientation to person, place and time    Level of Consciousness alert   Follows Commands and Answers Questions 100% of the time   Personal Safety and Judgment intact   Memory intact   Pain Assessment   Patient Currently in Pain (rates pain 3-4/10 with activity)   Posture    Posture Forward head position;Protracted shoulders   Range of Motion (ROM)   ROM Comment WFL in BLE   Strength   Strength Comments WFL in BLE, able to bring legs into bed IND   Bed Mobility   Bed Mobility Comments SBA supine <> sit with HOB slightly elevated   Transfer Skills   Transfer Comments CGA sit <> Stand the FWW   Gait   Gait Comments CGA with FWW x340', decreased gait speed, decreased step length on LLE, decreased lateral weight shifting   Balance   Balance Comments Good, no LOB/lateral path deviation noted with static/dynamic standing activities   Sensory Examination   Sensory Perception Comments c/o baseline tingling in toes on both feet   General Therapy Interventions   Planned Therapy Interventions balance training;bed mobility training;gait training;ROM;strengthening;stretching;transfer training;risk factor education;home program guidelines;progressive activity/exercise   Clinical Impression   Criteria for Skilled Therapeutic Intervention yes, treatment indicated   PT Diagnosis decreased functional mobility   Influenced by the following impairments functional weakness, pain   Functional limitations due to impairments bed mobility, transfers, ambulaiton, stair climbing   Clinical Presentation Stable/Uncomplicated   Clinical Presentation Rationale stable clinical picture this date (pain, vitals, tele)   Clinical Decision Making (Complexity) Low complexity   Therapy Frequency` 2 times/day   Predicted Duration of Therapy Intervention (days/wks) 3 days   Anticipated Equipment Needs at Discharge (has already borrowed walker and SEC for home)   Anticipated Discharge Disposition Home with Assist   Risk & Benefits of therapy have been explained Yes   Patient, Family & other staff  "in agreement with plan of care Yes   [facility] TM \"6 Clicks\"   2016, Trustees of [facility], under license to [employer].  All rights reserved.   6 Clicks Short Forms Basic Mobility Inpatient Short Form   [facility]  \"6 Clicks\" V.2 Basic Mobility Inpatient Short Form   1. Turning from your back to your side while in a flat bed without using bedrails? 3 - A Little   2. Moving from lying on your back to sitting on the side of a flat bed without using bedrails? 3 - A Little   3. Moving to and from a bed to a chair (including a wheelchair)? 3 - A Little   4. Standing up from a chair using your arms (e.g., wheelchair, or bedside chair)? 3 - A Little   5. To walk in hospital room? 3 - A Little   6. Climbing 3-5 steps with a railing? 3 - A Little   Basic Mobility Raw Score (Score out of 24.Lower scores equate to lower levels of function) 18   Total Evaluation Time   Total Evaluation Time (Minutes) 9     "

## 2017-10-19 NOTE — PROGRESS NOTES
Per Dr. Fuentes, patient cleared by both cardiology and her to be ok to discharge.  Called 's office, left message re this, requesting if ok with ortho to put in his orders.    Will update primary RN, Marcy Krause.

## 2017-10-19 NOTE — PLAN OF CARE
Problem: Patient Care Overview  Goal: Plan of Care/Patient Progress Review  Outcome: No Change  VS stable. Tele is sinus rhythm with BBB. Complained of pain in right hip and pain med's given. Slept well throughout the night.

## 2017-10-19 NOTE — PLAN OF CARE
"Problem: Patient Care Overview  Goal: Plan of Care/Patient Progress Review  Outcome: Improving  Ate well regular diet. Drinking well. Unable to void, bladder scanned. Straight cath for 300ml. Dangled at bedside. No pain first part of shift. Pain increasing at 2200 \"2\". Oxycodone with no relief. Flexeril at 2300, with no relief. Given iv dilaudid for \"4\" at 2333. drsg with small amt dried drainage. Cms intact. VSS, afebrile. Family present and supportive. Telemetry SR w/ BBB. Denies chest pain, pressure, or SOB. Troponin negative.       "

## 2017-10-19 NOTE — PROGRESS NOTES
[facility]    Hospitalist Progress Note  Name: Dougie Portillo    MRN: [id number]  Provider: Iva Fuentes MD  Date of Service: 10/19/2017    Assessment & Plan   Summary of Stay: Mr. Portillo is a  71 y/o male with PMH sig for HTN, HLD, Sleep apnea and Boderline DM  Admitted for elective total hip arthroplasty. Hospitalist team was consulted as patient was noted to have heart block while monitored postoperatively and for medical management.     Abnormal EKG  -- tele strip from PACU with suspected Morbitz Type 11 abnormality but not clear on review by cards.   -- follow up EKG shows T inversion in lead  3, AVF, and lateral leads (new) with RBBB(old)  -- Patient asymptomatic at this time  -- no new episodes noted on Tele when observed in the hospital  -- remained asymptomatic  -- Gives h/o occasional lightheadedness and Dyspnea on exertion  -- Recent echo 10/9 with EF of 55% with dilated Aorta  -- Will HOLD metoprolol not to resume on discharge    HTN  -- continue Lisinopril and HCTZ  --Holding BB  -- pt to monitor bp and follow up with pcp for further adjustment of medications    Elevated WBC count  -- no clear source of infection  -- pt afebrile.   -- follow cbc in 1 week as out pt. Likely stress induced  -- received cefazolin perioperatively     HLD  On statins     Boderline DM hgba1c of 6.4  -- sliding scale insulin while inpt  -- cardiac and diabetic diet  -- pt given education on diabetic diet  -- will follow up with pcp     S/p Right BERRY  -- pain meds, PT and anticoagulation per primary team     H/o Sleep apnea  -- EKG with RBBB  -- encourage use of CPAP     S/p nephrectomy: renal donor  -- monitor creatinine in 1 week after discharge         DVT Prophylaxis: on Xarelto  Code Status: Full Code    Disposition: today if OK per primary team      Interval History   Feels well, no cp, no l, no dizziness, no sob, review of all systems negative    -Data reviewed today: I reviewed all new labs and imaging  reports over the last 24 hours. I personally reviewed no images or EKG's today.    Physical Exam   Temp: 98.2  F (36.8  C) Temp src: Oral BP: 114/61   Heart Rate: 68 Resp: 18 SpO2: 98 % O2 Device: None (Room air) Oxygen Delivery: 10 LPM  Vitals:    10/16/17 1300   Weight: 109.8 kg (242 lb)     Vital Signs with Ranges  Temp:  [96.5  F (35.8  C)-98.6  F (37  C)] 98.2  F (36.8  C)  Heart Rate:  [53-74] 68  Resp:  [12-18] 18  BP: (114-136)/(53-81) 114/61  SpO2:  [96 %-100 %] 98 %  I/O last 3 completed shifts:  In: 3037 [P.O.:1220; I.V.:1817]  Out: 1175 [Urine:1025; Blood:150]      GENERAL:  Comfortable.  PSYCH: pleasant, oriented, No acute distress.  EYES: PERRLA, Normal conjunctiva.  HEART:  Normal S1, S2 with no edema.  LUNGS:  Clear to auscultation, normal Respiratory effort.  ABDOMEN:  Soft, no hepatosplenomegaly, normal bowel sounds.  SKIN:  Dry to touch, No rash.  Neuro: AAOX3, CN's intact. Sensation and Reflexes intact.       Medications     lactated ringers 25 mL/hr at 10/19/17 0525       sodium chloride (PF)  3 mL Intracatheter Q8H     hydrochlorothiazide (HYDRODIURIL) tablet 25 mg  25 mg Oral Daily     lisinopril (PRINIVIL/ZESTRIL) tablet 10 mg  10 mg Oral Daily     rosuvastatin  10 mg Oral Daily     sertraline (ZOLOFT) tablet 100 mg  100 mg Oral Daily     sodium chloride (PF)  3 mL Intracatheter Q8H     rivaroxaban ANTICOAGULANT  10 mg Oral Daily     acetaminophen  325 mg Oral Q8H     insulin aspart  1-3 Units Subcutaneous TID AC     insulin aspart  1-3 Units Subcutaneous At Bedtime     Data       Recent Labs  Lab 10/19/17  0645 10/18/17  1518   WBC 14.9* 12.0*   HGB 12.1* 13.0*   HCT 36.6* 39.4*   MCV 86 86    211       Recent Labs  Lab 10/19/17  0645 10/18/17  1518    137   POTASSIUM 3.6 3.4   CHLORIDE 100 100   CO2 30 31   ANIONGAP 6 6   * 202*   BUN 22 23   CR 1.13 1.31*   GFRESTIMATED 64 54*   GFRESTBLACK 77 65   DIO 8.3* 8.2*     No results for input(s): AST, ALT, GGT, ALKPHOS,  BILITOTAL, BILICONJ, BILIDIRECT, ZA in the last 168 hours.    Invalid input(s): BILIRUBININDIRECT  No results for input(s): INR in the last 168 hours.  No results for input(s): CHOL, HDL, LDL, TRIG, CHOLHDLRATIO in the last 168 hours.    Recent Labs  Lab 10/19/17  0210 10/18/17  2250 10/18/17  1822   TROPI <0.015 <0.015 <0.015       Recent Results (from the past 24 hour(s))   XR Pelvis w Hip Port Right 1 View    Narrative    XR PELVIS AD HIP PORTABLE RIGHT 1 VIEW 10/18/2017 12:48 PM    HISTORY: Postop.    COMPARISON: None.      Impression    IMPRESSION: Status post right total hip arthroplasty. Hardware is  intact. Alignment is anatomic. Marked degenerative changes of left  hip.    THAD CARUSO MD

## 2017-10-19 NOTE — PLAN OF CARE
Problem: Patient Care Overview  Goal: Plan of Care/Patient Progress Review  PT: Orders received, eval completed, treatment initiated.  Pt POD #1 s/p R BERRY, no precautions per approach.  Lives in 2 story home with spouse, x2 steps to enter without rail.  IND with functional mobility and ADLs at baseline.     Discharge Planner PT   Patient plan for discharge: Per pt, home  Current status: SBA supine <> sit with HOB minimally elevated, CGA sit <> Stand with FWW, CGA with FWW x340', rates pain 3-4/10 with activity  Barriers to return to prior living situation: Stairs - will initiate in therapy as able  Recommendations for discharge: TBD POD #2  Rationale for recommendations: TBD       Entered by: Simran Small 10/19/2017 8:50 AM

## 2017-10-19 NOTE — PLAN OF CARE
Problem: Patient Care Overview  Goal: Plan of Care/Patient Progress Review  OT- orders received, chart reviewed, evaluation completed, treatment initiated. Patient is a 72 y.o. male seen POD #1 s/p R BERRY. PMH includes HTN, HLD, sleep apnea and borderline DM. He lives in a multi-level home with his wife who is also retired. His bedroom and bathroom are located on the 2nd floor with approx. 12 steps to access upper level and 2 to enter the home. He was independent in ADLs PTA.     Discharge Planner OT   Patient plan for discharge: Home with spouse A  Current status: Supine>sit MIN A, mod exertion noted. Sit>stand CGA with FWW; some unsteadiness noted. Ambulated approx. 4' to chair; stand>sit SBA. Pt completed LB dressing with MOD I using reacher and sock-aid after instruction; CGA in stance to pull pants up. UB dressing SBA, A needed to manage telemetry box. Pt ambulated to bathroom with CGA and FWW, unsteadiness noted at times. Reviewed home bathroom set-up, moved stand>sit on comfort height toilet using grab bar, CGA. Excess reaching noted from fww; VCs needed for safe hand placement. While seated, educated pt on safe walk-in shower transfer with mock seat. Pt sit>stand from comfort height toilet, MIN-MOD A and sig use of grab bars. VCs needed for proper hand placements on walker. Given space limitations, pt trialed walk-in shower transfer via side step with mock seat removed. Min LOB noted during transfer, MOD A required to maintain balance. Ambulated from bathroom to chair CGA and FWW.   Barriers to return to prior living situation: Level of A needed in ADL and functional mobility tasks; stairs-defer to PT  Recommendations for discharge: TBD POD #2  Rationale for recommendations: TBD POD #2       Entered by: Cheryl Conrad 10/19/2017 2:59 PM

## 2017-10-19 NOTE — PROGRESS NOTES
Appreciate cardiology help.    Hip is actually feeling well. Will transfer back to Ortho floor and resume usual BERRY protocol.

## 2017-10-19 NOTE — PHARMACY - DISCHARGE MEDICATION RECONCILIATION AND EDUCATION
Discharge medication review for this patient is complete. Face-to-face medication education was provided by the pharmacist.  See [facility] for allergy information and immunization status.   Pharmacist assisted with medication reconciliation of discharge medications with PTA medications.    Patient was informed to STOP taking the following HOME medications:   metoprolol    Patient was informed to START taking the following NEW medications:   Oxycodone and xarelto    Patient was informed to make the following changes to prior to admission medications:  none    Patient was educated on the following for each discharge medication:   Rationale for therapy  Duration of treatment  Dosing and or monitoring drug levels  Common side effects  Importance of compliance  Drug/food interactions  Missed doses  Self monitoring parameters    Left written materials and instructions (Clinical notes from [facility]) for new medications: Yes    OUTCOMES: Patient verbalized understanding    IMPORTANT FOLLOW UP NOTES:   - Informed patient of the importance of follow-up with PCP and dietician    Discharge Medication List     Review of your medicines      START taking       Dose / Directions    oxyCODONE 5 MG IR tablet   Commonly known as:  ROXICODONE        Dose:  5-10 mg   Take 1-2 tablets (5-10 mg) by mouth every 3 hours as needed for moderate to severe pain   Quantity:  30 tablet   Refills:  0       rivaroxaban ANTICOAGULANT 10 MG Tabs tablet   Commonly known as:  XARELTO        Dose:  10 mg   Take 1 tablet (10 mg) by mouth daily for 14 days   Quantity:  14 tablet   Refills:  0         CONTINUE these medicines which have NOT CHANGED       Dose / Directions    ASPIRIN PO        Dose:  81 mg   Take 81 mg by mouth daily   Refills:  0       HYDROCHLOROTHIAZIDE PO        Dose:  25 mg   Take 25 mg by mouth daily   Refills:  0       IBUPROFEN PO        Dose:  400 mg   Take 400 mg by mouth every 6 hours as needed for moderate pain   Refills:  0        LISINOPRIL PO        Dose:  10 mg   Take 10 mg by mouth daily   Refills:  0       rosuvastatin 10 MG tablet   Commonly known as:  CRESTOR        Dose:  10 mg   Take 10 mg by mouth daily   Refills:  0       SERTRALINE HCL PO        Dose:  100 mg   Take 100 mg by mouth daily   Refills:  0         STOP taking          METOPROLOL TARTRATE PO                Where to get your medicines      These medications were sent to [place], [place] - [address] [facility]  [address] [facility] [address]     Phone:  [phone]      rivaroxaban ANTICOAGULANT 10 MG Tabs tablet         Some of these will need a paper prescription and others can be bought over the counter. Ask your nurse if you have questions.     Bring a paper prescription for each of these medications      oxyCODONE 5 MG IR tablet

## 2017-10-20 ENCOUNTER — APPOINTMENT (OUTPATIENT)
Dept: PHYSICAL THERAPY | Facility: CLINIC | Age: 72
DRG: 470 | End: 2017-10-20
Attending: ORTHOPAEDIC SURGERY
Payer: MEDICARE

## 2017-10-20 ENCOUNTER — APPOINTMENT (OUTPATIENT)
Dept: OCCUPATIONAL THERAPY | Facility: CLINIC | Age: 72
DRG: 470 | End: 2017-10-20
Attending: ORTHOPAEDIC SURGERY
Payer: MEDICARE

## 2017-10-20 VITALS
BODY MASS INDEX: 32.07 KG/M2 | TEMPERATURE: 98.9 F | HEART RATE: 58 BPM | SYSTOLIC BLOOD PRESSURE: 131 MMHG | WEIGHT: 242 LBS | OXYGEN SATURATION: 99 % | RESPIRATION RATE: 16 BRPM | HEIGHT: 73 IN | DIASTOLIC BLOOD PRESSURE: 76 MMHG

## 2017-10-20 LAB
GLUCOSE BLDC GLUCOMTR-MCNC: 115 MG/DL (ref 70–99)
GLUCOSE BLDC GLUCOMTR-MCNC: 121 MG/DL (ref 70–99)
GLUCOSE BLDC GLUCOMTR-MCNC: 123 MG/DL (ref 70–99)
HGB BLD-MCNC: 11.9 G/DL (ref 13.3–17.7)

## 2017-10-20 PROCEDURE — 97116 GAIT TRAINING THERAPY: CPT | Mod: GP | Performed by: PHYSICAL THERAPY ASSISTANT

## 2017-10-20 PROCEDURE — A9270 NON-COVERED ITEM OR SERVICE: HCPCS | Mod: GY | Performed by: ORTHOPAEDIC SURGERY

## 2017-10-20 PROCEDURE — 00000146 ZZHCL STATISTIC GLUCOSE BY METER IP

## 2017-10-20 PROCEDURE — 40000133 ZZH STATISTIC OT WARD VISIT: Performed by: REHABILITATION PRACTITIONER

## 2017-10-20 PROCEDURE — 99232 SBSQ HOSP IP/OBS MODERATE 35: CPT | Performed by: INTERNAL MEDICINE

## 2017-10-20 PROCEDURE — 97110 THERAPEUTIC EXERCISES: CPT | Mod: GP | Performed by: PHYSICAL THERAPY ASSISTANT

## 2017-10-20 PROCEDURE — 40000193 ZZH STATISTIC PT WARD VISIT: Performed by: PHYSICAL THERAPY ASSISTANT

## 2017-10-20 PROCEDURE — 36416 COLLJ CAPILLARY BLOOD SPEC: CPT | Performed by: ORTHOPAEDIC SURGERY

## 2017-10-20 PROCEDURE — 85018 HEMOGLOBIN: CPT | Performed by: ORTHOPAEDIC SURGERY

## 2017-10-20 PROCEDURE — 97535 SELF CARE MNGMENT TRAINING: CPT | Mod: GO | Performed by: REHABILITATION PRACTITIONER

## 2017-10-20 PROCEDURE — 25000132 ZZH RX MED GY IP 250 OP 250 PS 637: Mod: GY | Performed by: ORTHOPAEDIC SURGERY

## 2017-10-20 PROCEDURE — 97530 THERAPEUTIC ACTIVITIES: CPT | Mod: GP | Performed by: PHYSICAL THERAPY ASSISTANT

## 2017-10-20 RX ORDER — OXYCODONE HYDROCHLORIDE 5 MG/1
5-10 TABLET ORAL
Qty: 30 TABLET | Refills: 0 | Status: SHIPPED | OUTPATIENT
Start: 2017-10-20 | End: 2018-06-26

## 2017-10-20 RX ADMIN — LISINOPRIL 10 MG: 10 TABLET ORAL at 08:45

## 2017-10-20 RX ADMIN — ACETAMINOPHEN 325 MG: 325 TABLET, FILM COATED ORAL at 00:05

## 2017-10-20 RX ADMIN — OXYCODONE HYDROCHLORIDE 5 MG: 5 TABLET ORAL at 08:45

## 2017-10-20 RX ADMIN — ACETAMINOPHEN 325 MG: 325 TABLET, FILM COATED ORAL at 08:45

## 2017-10-20 RX ADMIN — OXYCODONE HYDROCHLORIDE 5 MG: 5 TABLET ORAL at 01:44

## 2017-10-20 RX ADMIN — RIVAROXABAN 10 MG: 10 TABLET, FILM COATED ORAL at 08:45

## 2017-10-20 RX ADMIN — SERTRALINE HYDROCHLORIDE 100 MG: 100 TABLET ORAL at 08:45

## 2017-10-20 RX ADMIN — HYDROCHLOROTHIAZIDE 25 MG: 25 TABLET ORAL at 08:45

## 2017-10-20 RX ADMIN — ROSUVASTATIN CALCIUM 10 MG: 5 TABLET, FILM COATED ORAL at 08:48

## 2017-10-20 NOTE — PLAN OF CARE
Problem: Hip Arthroplasty (Total, Partial) (Adult)  Goal: Signs and Symptoms of Listed Potential Problems Will be Absent, Minimized or Managed (Hip Arthroplasty)  Signs and symptoms of listed potential problems will be absent, minimized or managed by discharge/transition of care (reference Hip Arthroplasty (Total, Partial) (Adult) CPG).   Outcome: Adequate for Discharge Date Met:  10/20/17  Day RN  A/O.  VSS, afebrile.  Pain managed with oral oxycodone.  CMS intact.  Aquacel changed prior to DC, incision WNL.  Up with SBA and WW ambulating in room and unger.  Voiding adequately in urinal.  Tolerating CHO, Low Na diet,  and 121.  Reviewed discharge instructions and medications with pt and spouse, answered question.  Educated pt on new diagnosis of DM and gave education materials, pt instructed to follow up with PCP regarding this within 7 days.  Pt sent home with filled Rx's of oxycodone and xarelto.  Pt walked out by NST.

## 2017-10-20 NOTE — PLAN OF CARE
Problem: Patient Care Overview  Goal: Plan of Care/Patient Progress Review  Discharge Planner PT   Patient plan for discharge: home today if able  Current status: S for basic transfers limited by self report of foggy, gait with RW ( has at home) up and down 4 steps with SEC and rail Min A   Barriers to return to prior living situation: none anticaipted  Recommendations for discharge: after collaboration with PT POD#2 discharge recommendation is home  Rationale for recommendations: will met goals after PM session.       Entered by: Marley Rinaldi 10/20/2017 9:23 AM

## 2017-10-20 NOTE — PROGRESS NOTES
Dougie Portillo  10/20/2017  POD # 2  Subjective:     Doing well.  Objective: no immediate surgical complications identified.    Exam:  CMS: intact  alert, stable, wound ok        PLAN: Discharge plan:   Plan to discharge home today. Change Aquacel dressing prior to discharge, keep steri-strips in place.

## 2017-10-20 NOTE — PLAN OF CARE
Problem: Patient Care Overview  Goal: Plan of Care/Patient Progress Review  PT- goals met at PM session gait with RW to 200 feet up and down stairs with 1 rail and SEC bed mobility with S and cues. Recommend return home this PM with family support.

## 2017-10-20 NOTE — PLAN OF CARE
Problem: Patient Care Overview  Goal: Plan of Care/Patient Progress Review  Outcome: No Change  [id number]: Pt resting comfortably. Pain controlled with oxycodone.  and 123. Transfers with Ax1 with walker, voiding. ambulated in the hallway. Tele reads: SR BBB with frequent PVC's per tele tech. Will continue to monitor.

## 2017-10-20 NOTE — PROGRESS NOTES
[facility]    Hospitalist Progress Note  Name: Dougie Portillo    MRN: [id number]  Provider: Iva Fuentes MD  Date of Service: 10/20/2017    Assessment & Plan   Summary of Stay: Mr. Portillo is a  71 y/o male with PMH sig for HTN, HLD, Sleep apnea and Boderline DM  Admitted for elective total hip arthroplasty. Hospitalist team was consulted as patient was noted to have heart block while monitored postoperatively and for medical management.     Abnormal EKG  -- tele strip from PACU with suspected Morbitz Type 11 abnormality but not clear on review by cards.   -- follow up EKG shows T inversion in lead  3, AVF, and lateral leads (new) with RBBB(old)  -- Patient asymptomatic at this time  -- no new episodes noted on Tele when observed in the hospital  -- remained asymptomatic  -- Gives h/o occasional lightheadedness and Dyspnea on exertion  -- Recent echo 10/9 with EF of 55% with dilated Aorta  -- Will HOLD metoprolol not to resume on discharge    HTN  -- continue Lisinopril and HCTZ  --Holding BB  -- pt to monitor bp and follow up with pcp for further adjustment of medications    Elevated WBC count  -- no clear source of infection  -- pt afebrile.   -- follow cbc in 1 week as out pt. Likely stress induced  -- received cefazolin perioperatively     HLD  On statins     Boderline DM hgba1c of 6.4  -- sliding scale insulin while inpt  -- cardiac and diabetic diet  -- pt given education on diabetic diet  -- will follow up with pcp     S/p Right BERRY  -- pain meds, PT and anticoagulation per primary team     H/o Sleep apnea  -- EKG with RBBB  -- encourage use of CPAP     S/p nephrectomy: renal donor  -- monitor creatinine in 1 week after discharge         DVT Prophylaxis: on Xarelto  Code Status: Full Code    Disposition: today if OK per primary team      Interval History   Feels well, no cp, no l, no dizziness, no sob, review of all systems negative    -Data reviewed today: I reviewed all new labs and imaging  reports over the last 24 hours. I personally reviewed no images or EKG's today.    Physical Exam   Temp: 98.9  F (37.2  C) Temp src: Oral BP: 131/76   Heart Rate: 78 Resp: 16 SpO2: 99 % O2 Device: None (Room air)    Vitals:    10/16/17 1300   Weight: 109.8 kg (242 lb)     Vital Signs with Ranges  Temp:  [97.4  F (36.3  C)-99.8  F (37.7  C)] 98.9  F (37.2  C)  Heart Rate:  [73-84] 78  Resp:  [16-18] 16  BP: (131-145)/(51-76) 131/76  SpO2:  [91 %-99 %] 99 %  I/O last 3 completed shifts:  In: 480 [P.O.:480]  Out: 1450 [Urine:1450]      GENERAL:  Comfortable.  PSYCH: pleasant, oriented, No acute distress.  EYES: PERRLA, Normal conjunctiva.  HEART:  Normal S1, S2 with no edema.  LUNGS:  Clear to auscultation, normal Respiratory effort.  ABDOMEN:  Soft, no hepatosplenomegaly, normal bowel sounds.  SKIN:  Dry to touch, No rash.  Neuro: AAOX3, CN's intact. Sensation and Reflexes intact.       Medications        sodium chloride (PF)  3 mL Intracatheter Q8H     hydrochlorothiazide (HYDRODIURIL) tablet 25 mg  25 mg Oral Daily     lisinopril (PRINIVIL/ZESTRIL) tablet 10 mg  10 mg Oral Daily     rosuvastatin  10 mg Oral Daily     sertraline (ZOLOFT) tablet 100 mg  100 mg Oral Daily     sodium chloride (PF)  3 mL Intracatheter Q8H     rivaroxaban ANTICOAGULANT  10 mg Oral Daily     acetaminophen  325 mg Oral Q8H     insulin aspart  1-3 Units Subcutaneous TID AC     insulin aspart  1-3 Units Subcutaneous At Bedtime     Data       Recent Labs  Lab 10/20/17  0709 10/19/17  0645 10/18/17  1518   WBC  --  14.9* 12.0*   HGB 11.9* 12.1* 13.0*   HCT  --  36.6* 39.4*   MCV  --  86 86   PLT  --  219 211       Recent Labs  Lab 10/19/17  0645 10/18/17  1518    137   POTASSIUM 3.6 3.4   CHLORIDE 100 100   CO2 30 31   ANIONGAP 6 6   * 202*   BUN 22 23   CR 1.13 1.31*   GFRESTIMATED 64 54*   GFRESTBLACK 77 65   DIO 8.3* 8.2*     No results for input(s): AST, ALT, GGT, ALKPHOS, BILITOTAL, BILICONJ, BILIDIRECT, ZA in the last 168  hours.    Invalid input(s): BILIRUBININDIRECT  No results for input(s): INR in the last 168 hours.  No results for input(s): CHOL, HDL, LDL, TRIG, CHOLHDLRATIO in the last 168 hours.    Recent Labs  Lab 10/19/17  0210 10/18/17  2250 10/18/17  1822   TROPI <0.015 <0.015 <0.015       No results found for this or any previous visit (from the past 24 hour(s)).

## 2017-10-20 NOTE — PLAN OF CARE
Discharge Planner OT   Patient plan for discharge: Home with spouse A  Current status: Supine>sit with VCs required, CGA. Decreased safety awareness observed and required verbal cueing to correct. Sit>stand CGA, pt demo'ing proper hand placement on flat surface and fww. Ambulated to bathroom CGA with fww. Toileted in stance, SBA. Completed g&h sinkside in stance for approx. 4-5 mins, SBA. No LOB noted. Stand<>sit SBA on comfort height toilet using fww and grab bar. Minor uncontrolled descent observed; VCs required to move sit>stand for proper hand placement for fww safety. Reviewed walk-in shower transfer; verbal cueing required to discourage use of grab bar; moved into shower SBA. Stand>sit on shower bench SBA. Doffed LB clothing with MOD I with use of reacher, SBA when pt in stance to lower pants. SBA in doffing UB clothing, A needed to remove leads and telemetry box. Recommended grab bars for use in pt shower at home. Pt showering at end of session, nursing staff notified.   Barriers to return to prior living situation: Level of A needed in ADL and functional mobility tasks; stairs-defer to PT  Recommendations for discharge: Home with spouse A, initial supervision during functional mobility, bathing, and bed mobility. Recommend the following AE: reacher, grab bars  Rationale for recommendations: Patient demonstrating ability to complete ADLs and functional mobility tasks safe for d/c home with spouse A and supervision during some ADL and functional mobility tasks.       Entered by: Cheryl Conrad 10/20/2017 12:23 PM       Occupational Therapy Discharge Summary    Reason for therapy discharge:    All goals and outcomes met, no further needs identified.    Progress towards therapy goal(s). See goals on Care Plan in [facility] electronic health record for goal details.  Goals met    Therapy recommendation(s):    No further therapy is recommended.

## 2017-10-20 NOTE — DISCHARGE INSTRUCTIONS
Call surgeon or primary care md before or after your follow appointment if any of these issues occur:  1.Uncontrolled/persistant temperature, chills, sweats. Temp >101  2. Uncontrolled pain despite pain medication  3. Numbness or tingling in operative leg, weakness, falls  4. Increased/persistant bleeding,redness,swelling, bruising, drainage (yellow/odourous), or bleeding from or around incision or incision pulling apart.  5. Dizziness, lightheaded (could be pain meds)  6. Calf pain, hard/swollen/warm area, chest pain or shortness of breath   7. Constipation despite taking over the counter stool softner and laxative for constipation   8. Trouble voiding or signs or symptoms of urinary tract infection  9. Uncontrolled bleeding (nose bleed, incision)  10.  if unable to wake call 911    Please have all family and caregivers review this information.    Other instructions:  See general discharge instruction sheet for hips.  1. Do exercises as instructed by therapist-slowly increased activity as pain tolerates  2. Observe your hip precautions.  3. Walk daily increasing distance and time each day by a little.  4. Ice hip for swelling and discomfort 3-4 times daily for 20 minutes  5.Notify your dentist of your implant so you can get antibiotics before any dental work or for any other invasive procedure.  6. Take over the counter stool softener (mirilax, senna, colace) while on narcotics to prevent constipation so bowel movements are regular., take laxative (milk of magnesia) or a suppository if no bm in 2-3 days (take as directed)  8.Keep track of when you take all medication, especially pain medication. See bottles for instructions and side effects  9. Incentive spirometer hourly to help prevent pneumonia  10. See medication handouts for medication information and side effects  11. DO NOT DRINK alcohol or DRIVE on narcotic pain medication.    Friends and family please read and review all discharge information and  medication sheet    IF unable to wake call 911    Dressing information:Do not change dressing  Can shower with aquacell dressing, it is waterproof-do not remove the dressing will be changed at the follow up apt. with your suregon  Inspect surrounding skin daily for s/s of infections.   Do not soak in tub or pool.   If dressing  Loosens wash hands and tape edge down then call surgeon for direction-do not touch incision   If dressing is 1/2 or more full of drainage or leaking call your suregon  If dressing is half full of drainage call your surgeon      Follow up Appointment:  Make follow up apt with Dr. Caldwell in 10-14 days post surgery,call to make apt. [phone]. Call the same number with any questions or issues prior to or after apt.

## 2017-10-23 NOTE — DISCHARGE SUMMARY
Orthopedic Discharge Summary    Dougie Portillo MRN# [id number]   YOB: 1945 Age: 72 year old     Date of Admission:  10/18/2017  Date of Discharge:  10/20/2017  2:12 PM  Admitting Physician:  Jose Caldwell MD  Discharge Physician:  No att. providers found  Discharging Service:  Orthopedics     Home clinic: [facility]  Primary Provider: Markus Mccoy          Admission Diagnoses:   DJD right hip  S/P total hip arthroplasty          Discharge Diagnosis:   Patient Active Problem List   Diagnosis     S/P total hip arthroplasty                Discharge Disposition:   Discharged to home           Condition on Discharge:   Discharge condition: Stable           Medications Prior to Admission:     No prescriptions prior to admission.             Discharge Medications:     No current facility-administered medications for this encounter.      Current Outpatient Prescriptions   Medication Sig     oxyCODONE (ROXICODONE) 5 MG IR tablet Take 1-2 tablets (5-10 mg) by mouth every 3 hours as needed for moderate to severe pain     rivaroxaban ANTICOAGULANT (XARELTO) 10 MG TABS tablet Take 1 tablet (10 mg) by mouth daily for 14 days     LISINOPRIL PO Take 10 mg by mouth daily     HYDROCHLOROTHIAZIDE PO Take 25 mg by mouth daily     SERTRALINE HCL PO Take 100 mg by mouth daily     rosuvastatin (CRESTOR) 10 MG tablet Take 10 mg by mouth daily     IBUPROFEN PO Take 400 mg by mouth every 6 hours as needed for moderate pain     ASPIRIN PO Take 81 mg by mouth daily               Brief History of Illness:   Dougie Portillo is a 72 year old male who was admitted for hip osteoarthritis.          Hospital Course:     S/P total hip arthroplasty    * No resolved hospital problems. *              Discharge Instructions and Follow-Up:   Discharge diet: Regular   Discharge activity: Activity as tolerated   Discharge follow-up: Follow up with Dr. Caldwell in 10-14 days   Outpatient therapy: None    Home Care agency: None     Supplies and equipment: None   Lines and drains: None    Wound care: None   Other instructions: None

## 2018-06-28 NOTE — PHARMACY-ADMISSION MEDICATION HISTORY
Admission medication history interview status for this patient is complete. See [facility] admission navigator for allergy information, prior to admission medications and immunization status.     Med rec completed by pre admitting Olamide Cisneros RN [place] Jun 26, 2018  9:00 AM           Prior to Admission medications    Medication Sig Last Dose Taking? Auth Provider   ASPIRIN PO Take 81 mg by mouth daily  Yes Reported, Patient   IBUPROFEN PO Take 400 mg by mouth every 6 hours as needed for moderate pain  Yes Reported, Patient   LISINOPRIL PO Take 10 mg by mouth daily  Yes Reported, Patient   rosuvastatin (CRESTOR) 10 MG tablet Take 10 mg by mouth daily  Yes Reported, Patient   Sertraline HCl (ZOLOFT PO) Take 50 mg by mouth daily  Yes Unknown, Entered By History

## 2018-07-06 ENCOUNTER — HOSPITAL ENCOUNTER (INPATIENT)
Facility: CLINIC | Age: 73
LOS: 2 days | Discharge: HOME | DRG: 470 | End: 2018-07-08
Attending: ORTHOPAEDIC SURGERY | Admitting: ORTHOPAEDIC SURGERY
Payer: MEDICARE

## 2018-07-06 ENCOUNTER — ANESTHESIA EVENT (OUTPATIENT)
Dept: SURGERY | Facility: CLINIC | Age: 73
DRG: 470 | End: 2018-07-06
Payer: MEDICARE

## 2018-07-06 ENCOUNTER — APPOINTMENT (OUTPATIENT)
Dept: PHYSICAL THERAPY | Facility: CLINIC | Age: 73
DRG: 470 | End: 2018-07-06
Attending: ORTHOPAEDIC SURGERY
Payer: MEDICARE

## 2018-07-06 ENCOUNTER — ANESTHESIA (OUTPATIENT)
Dept: SURGERY | Facility: CLINIC | Age: 73
DRG: 470 | End: 2018-07-06
Payer: MEDICARE

## 2018-07-06 ENCOUNTER — APPOINTMENT (OUTPATIENT)
Dept: GENERAL RADIOLOGY | Facility: CLINIC | Age: 73
DRG: 470 | End: 2018-07-06
Attending: ORTHOPAEDIC SURGERY
Payer: MEDICARE

## 2018-07-06 DIAGNOSIS — Z96.641 STATUS POST TOTAL REPLACEMENT OF RIGHT HIP: Primary | ICD-10-CM

## 2018-07-06 PROBLEM — Z96.649 S/P TOTAL HIP ARTHROPLASTY: Status: ACTIVE | Noted: 2017-10-18

## 2018-07-06 PROCEDURE — 97161 PT EVAL LOW COMPLEX 20 MIN: CPT | Mod: GP

## 2018-07-06 PROCEDURE — C1776 JOINT DEVICE (IMPLANTABLE): HCPCS | Performed by: ORTHOPAEDIC SURGERY

## 2018-07-06 PROCEDURE — 37000008 ZZH ANESTHESIA TECHNICAL FEE, 1ST 30 MIN: Performed by: ORTHOPAEDIC SURGERY

## 2018-07-06 PROCEDURE — 12000007 ZZH R&B INTERMEDIATE

## 2018-07-06 PROCEDURE — 36000093 ZZH SURGERY LEVEL 4 1ST 30 MIN: Performed by: ORTHOPAEDIC SURGERY

## 2018-07-06 PROCEDURE — 25000125 ZZHC RX 250: Performed by: ORTHOPAEDIC SURGERY

## 2018-07-06 PROCEDURE — 25800025 ZZH RX 258: Performed by: ORTHOPAEDIC SURGERY

## 2018-07-06 PROCEDURE — 36000063 ZZH SURGERY LEVEL 4 EA 15 ADDTL MIN: Performed by: ORTHOPAEDIC SURGERY

## 2018-07-06 PROCEDURE — A9270 NON-COVERED ITEM OR SERVICE: HCPCS | Mod: GY | Performed by: ORTHOPAEDIC SURGERY

## 2018-07-06 PROCEDURE — C1713 ANCHOR/SCREW BN/BN,TIS/BN: HCPCS | Performed by: ORTHOPAEDIC SURGERY

## 2018-07-06 PROCEDURE — 71000013 ZZH RECOVERY PHASE 1 LEVEL 1 EA ADDTL HR: Performed by: ORTHOPAEDIC SURGERY

## 2018-07-06 PROCEDURE — 40000985 XR PELVIS AND HIP PORTABLE LEFT 1 VIEW

## 2018-07-06 PROCEDURE — 25000128 H RX IP 250 OP 636: Performed by: ANESTHESIOLOGY

## 2018-07-06 PROCEDURE — 71000012 ZZH RECOVERY PHASE 1 LEVEL 1 FIRST HR: Performed by: ORTHOPAEDIC SURGERY

## 2018-07-06 PROCEDURE — 25000128 H RX IP 250 OP 636: Performed by: NURSE ANESTHETIST, CERTIFIED REGISTERED

## 2018-07-06 PROCEDURE — 97116 GAIT TRAINING THERAPY: CPT | Mod: GP

## 2018-07-06 PROCEDURE — 27810169 ZZH OR IMPLANT GENERAL: Performed by: ORTHOPAEDIC SURGERY

## 2018-07-06 PROCEDURE — 40000306 ZZH STATISTIC PRE PROC ASSESS II: Performed by: ORTHOPAEDIC SURGERY

## 2018-07-06 PROCEDURE — 25000132 ZZH RX MED GY IP 250 OP 250 PS 637: Mod: GY | Performed by: ORTHOPAEDIC SURGERY

## 2018-07-06 PROCEDURE — 40000193 ZZH STATISTIC PT WARD VISIT

## 2018-07-06 PROCEDURE — 25000566 ZZH SEVOFLURANE, EA 15 MIN: Performed by: ORTHOPAEDIC SURGERY

## 2018-07-06 PROCEDURE — 25000128 H RX IP 250 OP 636: Performed by: ORTHOPAEDIC SURGERY

## 2018-07-06 PROCEDURE — 25000125 ZZHC RX 250: Performed by: NURSE ANESTHETIST, CERTIFIED REGISTERED

## 2018-07-06 PROCEDURE — 27210794 ZZH OR GENERAL SUPPLY STERILE: Performed by: ORTHOPAEDIC SURGERY

## 2018-07-06 PROCEDURE — 97110 THERAPEUTIC EXERCISES: CPT | Mod: GP

## 2018-07-06 PROCEDURE — 97530 THERAPEUTIC ACTIVITIES: CPT | Mod: GP

## 2018-07-06 PROCEDURE — 0SRB02A REPLACEMENT OF LEFT HIP JOINT WITH METAL ON POLYETHYLENE SYNTHETIC SUBSTITUTE, UNCEMENTED, OPEN APPROACH: ICD-10-PCS | Performed by: ORTHOPAEDIC SURGERY

## 2018-07-06 PROCEDURE — 37000009 ZZH ANESTHESIA TECHNICAL FEE, EACH ADDTL 15 MIN: Performed by: ORTHOPAEDIC SURGERY

## 2018-07-06 DEVICE — IMPLANTABLE DEVICE: Type: IMPLANTABLE DEVICE | Site: HIP | Status: FUNCTIONAL

## 2018-07-06 DEVICE — IMP HOLE COVER SNN ACETAB CUP REFLEC INTERFIT 71336500: Type: IMPLANTABLE DEVICE | Site: HIP | Status: FUNCTIONAL

## 2018-07-06 DEVICE — IMP HEAD FEMORAL SNR COBALT 36MM +0 71303600: Type: IMPLANTABLE DEVICE | Site: HIP | Status: FUNCTIONAL

## 2018-07-06 RX ORDER — ONDANSETRON 4 MG/1
4 TABLET, ORALLY DISINTEGRATING ORAL EVERY 30 MIN PRN
Status: DISCONTINUED | OUTPATIENT
Start: 2018-07-06 | End: 2018-07-06 | Stop reason: HOSPADM

## 2018-07-06 RX ORDER — LIDOCAINE 40 MG/G
CREAM TOPICAL
Status: DISCONTINUED | OUTPATIENT
Start: 2018-07-06 | End: 2018-07-08 | Stop reason: HOSPADM

## 2018-07-06 RX ORDER — PROPOFOL 10 MG/ML
INJECTION, EMULSION INTRAVENOUS PRN
Status: DISCONTINUED | OUTPATIENT
Start: 2018-07-06 | End: 2018-07-06

## 2018-07-06 RX ORDER — GLYCOPYRROLATE 0.2 MG/ML
INJECTION, SOLUTION INTRAMUSCULAR; INTRAVENOUS PRN
Status: DISCONTINUED | OUTPATIENT
Start: 2018-07-06 | End: 2018-07-06

## 2018-07-06 RX ORDER — LIDOCAINE HYDROCHLORIDE 10 MG/ML
INJECTION, SOLUTION INFILTRATION; PERINEURAL PRN
Status: DISCONTINUED | OUTPATIENT
Start: 2018-07-06 | End: 2018-07-06

## 2018-07-06 RX ORDER — DEXAMETHASONE SODIUM PHOSPHATE 4 MG/ML
4 INJECTION, SOLUTION INTRA-ARTICULAR; INTRALESIONAL; INTRAMUSCULAR; INTRAVENOUS; SOFT TISSUE
Status: DISCONTINUED | OUTPATIENT
Start: 2018-07-06 | End: 2018-07-06 | Stop reason: HOSPADM

## 2018-07-06 RX ORDER — NALOXONE HYDROCHLORIDE 0.4 MG/ML
.1-.4 INJECTION, SOLUTION INTRAMUSCULAR; INTRAVENOUS; SUBCUTANEOUS
Status: ACTIVE | OUTPATIENT
Start: 2018-07-06 | End: 2018-07-07

## 2018-07-06 RX ORDER — KETOROLAC TROMETHAMINE 15 MG/ML
15 INJECTION, SOLUTION INTRAMUSCULAR; INTRAVENOUS
Status: COMPLETED | OUTPATIENT
Start: 2018-07-06 | End: 2018-07-06

## 2018-07-06 RX ORDER — CYCLOBENZAPRINE HCL 10 MG
10 TABLET ORAL 3 TIMES DAILY PRN
Status: DISCONTINUED | OUTPATIENT
Start: 2018-07-06 | End: 2018-07-08 | Stop reason: HOSPADM

## 2018-07-06 RX ORDER — ONDANSETRON 2 MG/ML
4 INJECTION INTRAMUSCULAR; INTRAVENOUS EVERY 30 MIN PRN
Status: DISCONTINUED | OUTPATIENT
Start: 2018-07-06 | End: 2018-07-06 | Stop reason: HOSPADM

## 2018-07-06 RX ORDER — CEFAZOLIN SODIUM 2 G/100ML
2 INJECTION, SOLUTION INTRAVENOUS
Status: COMPLETED | OUTPATIENT
Start: 2018-07-06 | End: 2018-07-06

## 2018-07-06 RX ORDER — MEPERIDINE HYDROCHLORIDE 50 MG/ML
12.5 INJECTION INTRAMUSCULAR; INTRAVENOUS; SUBCUTANEOUS EVERY 5 MIN PRN
Status: DISCONTINUED | OUTPATIENT
Start: 2018-07-06 | End: 2018-07-06 | Stop reason: HOSPADM

## 2018-07-06 RX ORDER — ONDANSETRON 2 MG/ML
INJECTION INTRAMUSCULAR; INTRAVENOUS PRN
Status: DISCONTINUED | OUTPATIENT
Start: 2018-07-06 | End: 2018-07-06

## 2018-07-06 RX ORDER — FENTANYL CITRATE 50 UG/ML
INJECTION, SOLUTION INTRAMUSCULAR; INTRAVENOUS PRN
Status: DISCONTINUED | OUTPATIENT
Start: 2018-07-06 | End: 2018-07-06

## 2018-07-06 RX ORDER — HYDROMORPHONE HYDROCHLORIDE 1 MG/ML
.3-.5 INJECTION, SOLUTION INTRAMUSCULAR; INTRAVENOUS; SUBCUTANEOUS EVERY 5 MIN PRN
Status: DISCONTINUED | OUTPATIENT
Start: 2018-07-06 | End: 2018-07-06 | Stop reason: HOSPADM

## 2018-07-06 RX ORDER — METOCLOPRAMIDE HYDROCHLORIDE 5 MG/ML
5 INJECTION INTRAMUSCULAR; INTRAVENOUS EVERY 6 HOURS PRN
Status: DISCONTINUED | OUTPATIENT
Start: 2018-07-06 | End: 2018-07-06

## 2018-07-06 RX ORDER — METOCLOPRAMIDE 5 MG/1
5 TABLET ORAL EVERY 6 HOURS PRN
Status: DISCONTINUED | OUTPATIENT
Start: 2018-07-06 | End: 2018-07-06

## 2018-07-06 RX ORDER — ROSUVASTATIN CALCIUM 5 MG/1
10 TABLET, COATED ORAL DAILY
Status: DISCONTINUED | OUTPATIENT
Start: 2018-07-06 | End: 2018-07-08 | Stop reason: HOSPADM

## 2018-07-06 RX ORDER — LIDOCAINE 40 MG/G
CREAM TOPICAL
Status: DISCONTINUED | OUTPATIENT
Start: 2018-07-06 | End: 2018-07-06 | Stop reason: HOSPADM

## 2018-07-06 RX ORDER — EPHEDRINE SULFATE 50 MG/ML
INJECTION, SOLUTION INTRAMUSCULAR; INTRAVENOUS; SUBCUTANEOUS PRN
Status: DISCONTINUED | OUTPATIENT
Start: 2018-07-06 | End: 2018-07-06

## 2018-07-06 RX ORDER — CEFAZOLIN SODIUM 2 G/100ML
2 INJECTION, SOLUTION INTRAVENOUS EVERY 8 HOURS
Status: COMPLETED | OUTPATIENT
Start: 2018-07-06 | End: 2018-07-07

## 2018-07-06 RX ORDER — OXYCODONE AND ACETAMINOPHEN 5; 325 MG/1; MG/1
1-2 TABLET ORAL EVERY 4 HOURS PRN
Status: DISCONTINUED | OUTPATIENT
Start: 2018-07-06 | End: 2018-07-08 | Stop reason: HOSPADM

## 2018-07-06 RX ORDER — DIMENHYDRINATE 50 MG/ML
25 INJECTION, SOLUTION INTRAMUSCULAR; INTRAVENOUS
Status: DISCONTINUED | OUTPATIENT
Start: 2018-07-06 | End: 2018-07-06 | Stop reason: HOSPADM

## 2018-07-06 RX ORDER — LABETALOL HYDROCHLORIDE 5 MG/ML
10 INJECTION, SOLUTION INTRAVENOUS
Status: DISCONTINUED | OUTPATIENT
Start: 2018-07-06 | End: 2018-07-06 | Stop reason: HOSPADM

## 2018-07-06 RX ORDER — SODIUM CHLORIDE, SODIUM LACTATE, POTASSIUM CHLORIDE, CALCIUM CHLORIDE 600; 310; 30; 20 MG/100ML; MG/100ML; MG/100ML; MG/100ML
INJECTION, SOLUTION INTRAVENOUS CONTINUOUS
Status: DISCONTINUED | OUTPATIENT
Start: 2018-07-06 | End: 2018-07-06 | Stop reason: HOSPADM

## 2018-07-06 RX ORDER — NEOSTIGMINE METHYLSULFATE 1 MG/ML
VIAL (ML) INJECTION PRN
Status: DISCONTINUED | OUTPATIENT
Start: 2018-07-06 | End: 2018-07-06

## 2018-07-06 RX ORDER — FENTANYL CITRATE 50 UG/ML
25-50 INJECTION, SOLUTION INTRAMUSCULAR; INTRAVENOUS
Status: DISCONTINUED | OUTPATIENT
Start: 2018-07-06 | End: 2018-07-06 | Stop reason: HOSPADM

## 2018-07-06 RX ORDER — METOPROLOL TARTRATE 50 MG
50 TABLET ORAL
COMMUNITY
Start: 2018-03-28

## 2018-07-06 RX ORDER — HYDROMORPHONE HYDROCHLORIDE 1 MG/ML
0.2 INJECTION, SOLUTION INTRAMUSCULAR; INTRAVENOUS; SUBCUTANEOUS
Status: DISCONTINUED | OUTPATIENT
Start: 2018-07-06 | End: 2018-07-08 | Stop reason: HOSPADM

## 2018-07-06 RX ORDER — DEXAMETHASONE SODIUM PHOSPHATE 4 MG/ML
INJECTION, SOLUTION INTRA-ARTICULAR; INTRALESIONAL; INTRAMUSCULAR; INTRAVENOUS; SOFT TISSUE PRN
Status: DISCONTINUED | OUTPATIENT
Start: 2018-07-06 | End: 2018-07-06

## 2018-07-06 RX ORDER — HYDRALAZINE HYDROCHLORIDE 20 MG/ML
2.5-5 INJECTION INTRAMUSCULAR; INTRAVENOUS EVERY 10 MIN PRN
Status: DISCONTINUED | OUTPATIENT
Start: 2018-07-06 | End: 2018-07-06 | Stop reason: HOSPADM

## 2018-07-06 RX ORDER — CEFAZOLIN SODIUM 1 G/3ML
1 INJECTION, POWDER, FOR SOLUTION INTRAMUSCULAR; INTRAVENOUS SEE ADMIN INSTRUCTIONS
Status: DISCONTINUED | OUTPATIENT
Start: 2018-07-06 | End: 2018-07-06 | Stop reason: HOSPADM

## 2018-07-06 RX ORDER — SODIUM CHLORIDE 9 MG/ML
INJECTION, SOLUTION INTRAVENOUS CONTINUOUS
Status: DISCONTINUED | OUTPATIENT
Start: 2018-07-06 | End: 2018-07-08 | Stop reason: HOSPADM

## 2018-07-06 RX ORDER — LISINOPRIL 10 MG/1
10 TABLET ORAL DAILY
Status: DISCONTINUED | OUTPATIENT
Start: 2018-07-07 | End: 2018-07-08 | Stop reason: HOSPADM

## 2018-07-06 RX ORDER — METOPROLOL TARTRATE 50 MG
50 TABLET ORAL DAILY
Status: DISCONTINUED | OUTPATIENT
Start: 2018-07-07 | End: 2018-07-08 | Stop reason: HOSPADM

## 2018-07-06 RX ADMIN — LIDOCAINE HYDROCHLORIDE 50 MG: 10 INJECTION, SOLUTION INFILTRATION; PERINEURAL at 07:39

## 2018-07-06 RX ADMIN — MIDAZOLAM 1 MG: 1 INJECTION INTRAMUSCULAR; INTRAVENOUS at 09:33

## 2018-07-06 RX ADMIN — PROPOFOL 200 MG: 10 INJECTION, EMULSION INTRAVENOUS at 07:39

## 2018-07-06 RX ADMIN — OXYCODONE HYDROCHLORIDE AND ACETAMINOPHEN 2 TABLET: 5; 325 TABLET ORAL at 22:31

## 2018-07-06 RX ADMIN — SODIUM CHLORIDE, POTASSIUM CHLORIDE, SODIUM LACTATE AND CALCIUM CHLORIDE: 600; 310; 30; 20 INJECTION, SOLUTION INTRAVENOUS at 07:26

## 2018-07-06 RX ADMIN — OXYCODONE HYDROCHLORIDE AND ACETAMINOPHEN 2 TABLET: 5; 325 TABLET ORAL at 18:30

## 2018-07-06 RX ADMIN — Medication 0.2 MG: at 19:26

## 2018-07-06 RX ADMIN — ROSUVASTATIN CALCIUM 10 MG: 5 TABLET, FILM COATED ORAL at 21:31

## 2018-07-06 RX ADMIN — SODIUM CHLORIDE: 9 INJECTION, SOLUTION INTRAVENOUS at 16:10

## 2018-07-06 RX ADMIN — Medication 0.2 MG: at 21:23

## 2018-07-06 RX ADMIN — CYCLOBENZAPRINE HYDROCHLORIDE 10 MG: 10 TABLET, FILM COATED ORAL at 21:34

## 2018-07-06 RX ADMIN — ROCURONIUM BROMIDE 50 MG: 10 INJECTION INTRAVENOUS at 07:39

## 2018-07-06 RX ADMIN — Medication 4 MG: at 08:34

## 2018-07-06 RX ADMIN — LIDOCAINE HYDROCHLORIDE 10 ML: 20 JELLY TOPICAL at 20:00

## 2018-07-06 RX ADMIN — KETOROLAC TROMETHAMINE 15 MG: 15 INJECTION, SOLUTION INTRAMUSCULAR; INTRAVENOUS at 09:33

## 2018-07-06 RX ADMIN — FENTANYL CITRATE 150 MCG: 50 INJECTION, SOLUTION INTRAMUSCULAR; INTRAVENOUS at 07:39

## 2018-07-06 RX ADMIN — CEFAZOLIN SODIUM 2 G: 2 INJECTION, SOLUTION INTRAVENOUS at 07:26

## 2018-07-06 RX ADMIN — Medication 0.5 MG: at 09:27

## 2018-07-06 RX ADMIN — CEFAZOLIN SODIUM 2 G: 2 INJECTION, SOLUTION INTRAVENOUS at 16:09

## 2018-07-06 RX ADMIN — Medication 10 MG: at 07:48

## 2018-07-06 RX ADMIN — HYDROMORPHONE HYDROCHLORIDE 0.5 MG: 1 INJECTION, SOLUTION INTRAMUSCULAR; INTRAVENOUS; SUBCUTANEOUS at 07:57

## 2018-07-06 RX ADMIN — FENTANYL CITRATE 50 MCG: 50 INJECTION, SOLUTION INTRAMUSCULAR; INTRAVENOUS at 09:03

## 2018-07-06 RX ADMIN — SODIUM CHLORIDE, POTASSIUM CHLORIDE, SODIUM LACTATE AND CALCIUM CHLORIDE: 600; 310; 30; 20 INJECTION, SOLUTION INTRAVENOUS at 07:43

## 2018-07-06 RX ADMIN — FENTANYL CITRATE 100 MCG: 50 INJECTION, SOLUTION INTRAMUSCULAR; INTRAVENOUS at 07:50

## 2018-07-06 RX ADMIN — GLYCOPYRROLATE 0.6 MG: 0.2 INJECTION, SOLUTION INTRAMUSCULAR; INTRAVENOUS at 08:34

## 2018-07-06 RX ADMIN — Medication 0.5 MG: at 09:19

## 2018-07-06 RX ADMIN — DEXAMETHASONE SODIUM PHOSPHATE 4 MG: 4 INJECTION, SOLUTION INTRA-ARTICULAR; INTRALESIONAL; INTRAMUSCULAR; INTRAVENOUS; SOFT TISSUE at 07:39

## 2018-07-06 RX ADMIN — ONDANSETRON 4 MG: 2 INJECTION INTRAMUSCULAR; INTRAVENOUS at 08:23

## 2018-07-06 RX ADMIN — FENTANYL CITRATE 50 MCG: 50 INJECTION, SOLUTION INTRAMUSCULAR; INTRAVENOUS at 09:13

## 2018-07-06 RX ADMIN — ASPIRIN 325 MG: 325 TABLET, DELAYED RELEASE ORAL at 16:09

## 2018-07-06 NOTE — BRIEF OP NOTE
[facility] Brief Operative Note    Pre-operative diagnosis: DJD   Post-operative diagnosis * No post-op diagnosis entered *     Procedure: Procedure(s):  Left Total Hip Arthroplasty  - Wound Class: I-Clean   Surgeon(s): Surgeon(s) and Role:     * Jose Caldwell MD - Primary     * Josiah Callahan - Assisting   Estimated blood loss: 500 mL    Specimens: * No specimens in log *   Findings:

## 2018-07-06 NOTE — PLAN OF CARE
Problem: Patient Care Overview  Goal: Plan of Care/Patient Progress Review  PT: Patient seen by physical therapy for evaluation and treatment.  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  Patient lives in a 2 story home with wife, is retired.  Patient reports having right hip/low back pain since his previous right BERRY last year, has been using a cane with mobility since that time for pain control. Patient reports he has been diagnosed with spinal stenosis and will likely have to have surgery at some point, no radiating pain into LEs or sensation changes noted.  Discharge Planner PT   Patient plan for discharge: home  Current status: Patient supine upon arrival.  Participated in LE strengthening exercises in preparation for ambulation.  Patient transferred to sitting at EOB with HOB raised and min A.  Patient transferred from sitting to standing with CGA and verbal cueing for correct technique.  Patient reported no signs of dizziness, able to bear weight on left LE.  Patient amb 10 feet x2 with 2WW and CGA, verbal cueing for upright posture and weight shift onto left LE as tolerated.  Patient tolerated standing with 2WW and CGA for 2 min while in bathroom, required environmental set up, verbal cueing and min A to return to supine.    Barriers to return to prior living situation: stairs (will practice with therapy before discharge)  Recommendations for discharge: home, will require walker prior to discharge  Rationale for recommendations: Anticipate that with skilled physical therapy, patient will demonstrate independence with functional mobility in order to meet therapy goals and discharge to home safely with wife.       Entered by: Katharine Dixon 07/06/2018 4:12 PM

## 2018-07-06 NOTE — IP AVS SNAPSHOT
MRN:[id number]                      After Visit Summary   7/6/2018    Dougie Portillo    MRN: [id number]           Thank you!     Thank you for choosing [facility] for your care. Our goal is always to provide you with excellent care. Hearing back from our patients is one way we can continue to improve our services. Please take a few minutes to complete the written survey that you may receive in the mail after you visit. If you would like to speak to someone directly about your visit please contact Patient Relations at [phone]. Thank you!          Patient Information     Date Of Birth          1945        About your hospital stay     You were admitted on:  July 6, 2018 You last received care in the:  [facility]    You were discharged on:  July 8, 2018       Who to Call     For medical emergencies, please call 911.  For non-urgent questions about your medical care, please call your primary care provider or clinic, [phone]  For questions related to your surgery, please call your surgery clinic        Attending Provider     Provider Specialty    Jose Caldwell MD Orthopedics       Primary Care Provider Office Phone # Fax #    Markus Mccoy [phone]      Follow-up Appointments     Follow-up and recommended labs and tests        Follow up with Dr. Caldwell in 7-10 days.                  Further instructions from your care team       Your dressing is waterproof, you may take a shower. When done pat dry, do not rub. If dressing becomes saturated, or starts to peel please call your surgeons office for dressing change instructions. DO NOT submerge dressing or incision in water, NO baths, pools, hotubs, or swimming.         Pending Results     No orders found from 7/4/2018 to 7/7/2018.            Statement of Approval     Ordered          07/07/18 0919  I have reviewed and agree with all the recommendations and orders detailed in this  "document.  EFFECTIVE NOW     Approved and electronically signed by:  Jose Caldwell MD             Admission Information     Date & Time Provider Department Dept. Phone    2018 Jose Caldwell MD [facility] [phone]      Your Vitals Were     Blood Pressure Pulse Temperature Respirations Height Weight    106/58 (BP Location: Right arm) 67 99.4  F (37.4  C) (Oral) 16 1.854 m (6' 1\") 108.9 kg (240 lb)    Pulse Oximetry BMI (Body Mass Index)                96% 31.66 kg/m2          MyChart Information     [employer] lets you send messages to your doctor, view your test results, renew your prescriptions, schedule appointments and more. To sign up, go to [email or web address] . Click on \"Log in\" on the left side of the screen, which will take you to the Welcome page. Then click on \"Sign up Now\" on the right side of the page.     You will be asked to enter the access code listed below, as well as some personal information. Please follow the directions to create your username and password.     Your access code is: [email or web address]  Expires: 10/6/2018 12:54 PM     Your access code will  in 90 days. If you need help or a new code, please call your [facility] clinic or [phone].        Care EveryWhere ID     This is your Care EveryWhere ID. This could be used by other organizations to access your [facility] medical records  [id number]        Equal Access to Services     MAHI PALOMARES AH: Hadii demetria stokes hadasho Sobrittnyali, waaxda luqadaha, qaybta kaalmada dilmayajena, valeria james . So [facility] [phone].    ATENCIÓN: Si habla español, tiene a stratton disposición servicios gratuitos de asistencia lingüística. Llame al [phone].    We comply with applicable federal civil rights laws and [place] laws. We do not discriminate on the basis of race, color, national origin, age, disability, sex, sexual orientation, or gender identity.               Review of your medicines    "   START taking        Dose / Directions    aspirin 325 MG EC tablet   Indication:  VTE Prophylaxis        Dose:  325 mg   Take 1 tablet (325 mg) by mouth daily for 19 days   Quantity:  19 tablet   Refills:  0       order for DME        Equipment being ordered: Walker Wheels () and Walker () Treatment Diagnosis: difficulty with ambulation   Quantity:  1 each   Refills:  0       oxyCODONE-acetaminophen 5-325 MG per tablet   Commonly known as:  PERCOCET        Dose:  1-2 tablet   Take 1-2 tablets by mouth every 4 hours as needed for moderate to severe pain   Quantity:  30 tablet   Refills:  0         CONTINUE these medicines which have NOT CHANGED        Dose / Directions    ASPIRIN PO        Dose:  81 mg   Take 81 mg by mouth daily   Refills:  0       LISINOPRIL PO        Dose:  10 mg   Take 10 mg by mouth daily   Refills:  0       metoprolol tartrate 50 MG tablet   Commonly known as:  LOPRESSOR        Dose:  50 mg   Take 50 mg by mouth   Refills:  0       rosuvastatin 10 MG tablet   Commonly known as:  CRESTOR        Dose:  10 mg   Take 10 mg by mouth daily   Refills:  0       ZOLOFT PO        Dose:  50 mg   Take 50 mg by mouth daily   Refills:  0         STOP taking     IBUPROFEN PO                Where to get your medicines      These medications were sent to [facility] [address] [address] [address]     Phone:  [phone]     aspirin 325 MG EC tablet         Some of these will need a paper prescription and others can be bought over the counter. Ask your nurse if you have questions.     Bring a paper prescription for each of these medications     order for DME    oxyCODONE-acetaminophen 5-325 MG per tablet                Protect others around you: Learn how to safely use, store and throw away your medicines at www.disposemymeds.org.        Information about OPIOIDS     PRESCRIPTION OPIOIDS: WHAT YOU NEED TO KNOW   We gave you an  opioid (narcotic) pain medicine. It is important to manage your pain, but opioids are not always the best choice. You should first try all the other options your care team gave you. Take this medicine for as short a time (and as few doses) as possible.     These medicines have risks:    DO NOT drive when on new or higher doses of pain medicine. These medicines can affect your alertness and reaction times, and you could be arrested for driving under the influence (DUI). If you need to use opioids long-term, talk to your care team about driving.    DO NOT operate heave machinery    DO NOT do any other dangerous activities while taking these medicines.     DO NOT drink any alcohol while taking these medicines.      If the opioid prescribed includes acetaminophen, DO NOT take with any other medicines that contain acetaminophen. Read all labels carefully. Look for the word  acetaminophen  or  Tylenol.  Ask your pharmacist if you have questions or are unsure.    You can get addicted to pain medicines, especially if you have a history of addiction (chemical, alcohol or substance dependence). Talk to your care team about ways to reduce this risk.    Store your pills in a secure place, locked if possible. We will not replace any lost or stolen medicine. If you don t finish your medicine, please throw away (dispose) as directed by your pharmacist. The [place] Pollution Control Agency has more information about safe disposal: https://www.pca.[facility].us/living-green/managing-unwanted-medications.     All opioids tend to cause constipation. Drink plenty of water and eat foods that have a lot of fiber, such as fruits, vegetables, prune juice, apple juice and high-fiber cereal. Take a laxative (Miralax, milk of magnesia, Colace, Senna) if you don t move your bowels at least every other day.              Medication List: This is a list of all your medications and when to take them. Check marks below indicate your daily home  schedule. Keep this list as a reference.      Medications           Morning Afternoon Evening Bedtime As Needed    aspirin 325 MG EC tablet   Take 1 tablet (325 mg) by mouth daily for 19 days   Last time this was given:  325 mg on 7/8/2018  9:12 AM                                ASPIRIN PO   Take 81 mg by mouth daily   Last time this was given:  325 mg on 7/8/2018  9:12 AM                                LISINOPRIL PO   Take 10 mg by mouth daily   Last time this was given:  10 mg on 7/8/2018  9:12 AM                                metoprolol tartrate 50 MG tablet   Commonly known as:  LOPRESSOR   Take 50 mg by mouth   Last time this was given:  50 mg on 7/8/2018  9:11 AM                                order for DME   Equipment being ordered: Walker Wheels () and Walker () Treatment Diagnosis: difficulty with ambulation                                oxyCODONE-acetaminophen 5-325 MG per tablet   Commonly known as:  PERCOCET   Take 1-2 tablets by mouth every 4 hours as needed for moderate to severe pain   Last time this was given:  1 tablet on 7/8/2018  9:12 AM                                rosuvastatin 10 MG tablet   Commonly known as:  CRESTOR   Take 10 mg by mouth daily   Last time this was given:  10 mg on 7/7/2018  9:34 PM                                ZOLOFT PO   Take 50 mg by mouth daily   Last time this was given:  50 mg on 7/8/2018  9:12 AM                                          More Information        Hip Precautions  Your new hip has a limited safe range of motion. This means it can t bend and turn as much as a natural hip. So you ll need to move differently now than you did before surgery. This will help prevent your new hip from popping out of place (dislocating). Your healthcare team will teach you how to stay within your new hip s safe range of motion.  Sitting safely     Your new hip has a limited range of motion. Always sit with your knees lower than or level with your hips.   To protect  your new hip, you must sit with your knees lower than or level with your hips. To do this, sit in chairs with high seats. Placing a firm pillow on the seat of a chair can also help.  Following precautions  You must protect your new hip by following precautions such as avoiding certain positions and movements. This will allow your hip to heal and help keep it from dislocating. You may also be told to limit how much weight you put on your operated leg (weight-bearing). You will learn how to follow precautions when lying, sitting, and standing.     Flexion precaution     Don t bend over at the waist. And don t sit with your hips lower than your knees.    Adduction precaution     Don t cross your operated leg over your other leg. ALWAYS keep your thighs apart.    Internal rotation precaution     Don't turn your operated leg inward (pigeon toe).      Date Last Reviewed: 1/1/2018 2000-2017 [employer]. [address] [place] [address]. All rights reserved. This information is not intended as a substitute for professional medical care. Always follow your healthcare professional's instructions.                After Hip Replacement: Home Safety  Becoming more aware of hazards in your home can help make your recovery safer. You might want to have furniture rearranged so it s easier to get around. In the bathroom, aids like a shower hose and a raised toilet seat can help you stay safe. Don t forget to watch out for hazards like wet floors, loose or worn rugs, electrical cords, or uneven surfaces.      Date Last Reviewed: 1/1/2018 2000-2017 [employer]. [address] [place] [address]. All rights reserved. This information is not intended as a substitute for professional medical care. Always follow your healthcare professional's instructions.                After Total Hip Replacement: Recovering at Home    Whether you re recovering at home or in a rehabilitation facility,  you need to protect your new hip. Sit and move the way you were taught in the hospital. Be sure to see your surgeon for scheduled follow-up visits, and return to activity slowly. A total hip replacement is major surgery, so don t be surprised if it takes a few months before you feel really good.  See your surgeon  Post-op visits allow your surgeon to make sure your hip is healing well. Stitches or staples are often taken out about 2 weeks after surgery.  Call 911  Call 911 right away if you have any of the following:    Chest pain    Shortness of breath    Dizziness or confusion  When to call your healthcare provider   Call your surgeon or other healthcare provider right away if you have any of the following:    Hip pain gets worse    Pain or swelling in your calf or leg not related to your incision    Tenderness or redness in your calf    Fever of 100.4 F (38 C) or higher, or as directed by your healthcare provider    Shaking chills    Swelling or redness at the incision site gets worse    Fluid draining from the incision  Returning to activity  Practice walking daily. Try to do more each week. Start by getting your own glass of water. If the weather is good, walk to the corner to mail a letter. Keep at it--that s the main thing.  Sitting and dressing  To protect your new hip, an occupational therapist or physical therapist will teach you safer ways of doing daily tasks. Use the following tips when sitting, dressing, or using stairs.    To sit, back up until the edge of the chair touches your leg. Then, using the armrests to support your weight, lower yourself into the seat. Always keep your operated leg out in front.    To pull on socks and shoes, use a long-handled device, such as a grasper or hook. Try this with slip-on shoes first.    To wash your feet and legs, use a long-handled sponge and a shower hose.    To use stairs, step up first with your good leg. Then bring your operated leg up to meet it. When going  down, step down first with your operated leg.  Returning to sex  After the incision heals and you regain some hip movement, you may be ready to have sex. Ask your surgeon or the office nurse about when it is safe to start having sex, and what positions are safe.  Maintaining your new joint  An infection in your body could harm the new joint. Talk with your surgeon before scheduling medical or dental procedures. You may need to take antibiotics to prevent infection, even years after your surgery. To check joint stability over time, you may have X-rays every year or two.  Date Last Reviewed: 1/1/2018    Copyright 2013 [employer] All rights reserved. May not be reproduced or reprinted without permission from [employer]. Use of this information is governed by the [employer] User Agreement.                After Total Hip Replacement: Returning to Activity  By having a total hip replacement, you re taking the first step to getting back to an active lifestyle. You ll most likely use a walker to get around at first. You may then progress to crutches or a cane. You ll be shown how to use your walker or crutches safely. As you recover at home, you ll find yourself returning to your daily routine. Keep doing your exercises. And challenge yourself to walk even farther. Expect to see your efforts pay off as you increase your activity.    Using your skills at home  In the hospital, you practiced getting out of bed, walking, and doing daily tasks safely with your new hip. Once you return home, it s time to use what you ve learned. To keep your hip safe, always think before you move.  Develop a walking program  A good way to practice walking is by making it part of your daily routine. Once walking becomes easier, follow a walking program. Members of your healthcare team can help you create a walking program that s safe for you.  Extending yourself by walking farther  Slowly increase the amount of walking you do  around your home. Getting your own glass of water, going outside for the mail, and doing household chores like dusting are ways to practice walking. As you recover you ll move on to advanced activities, such as using the stairs.  Practice a smooth stride  To move easily, you must walk with a smooth motion. Watch yourself in a mirror while you walk toward it. Or have someone watch you. Make sure you re walking heel to toe, and with equal weight (and time) on each foot.  Being more active  The key to becoming active is sticking with your recovery program. Talk with your surgeon about the activities that you want to resume. Your surgeon will tell you when and how you can safely return to activities such as sex, swimming, gardening, and driving.  Date Last Reviewed: 1/1/2018 2000-2017 [employer]. [address] [place] [address]. All rights reserved. This information is not intended as a substitute for professional medical care. Always follow your healthcare professional's instructions.                After Hip Replacement: Using Your Walker  At first, you ll likely use a walker to get around. There are 3 main types of walkers; the standard non-rolling walker, non-rolling walker, the 2-wheeled (front) rolling walker, and the 4-wheeled rolling walker. Your physical therapist (PT) or occupational therapist (OT) will teach you how to use a walker safely and help you select the best one for you. Later, you may move from a walker to crutches or a cane.    Using a walker    Move the walker a few inches in front of you.    Lean on the walker so it supports you. Step into the center with your operated leg. Then step forward with your good leg. Repeat.    As you get more comfortable, you ll be able to move the walker as you step.  Walking up a curb    Move your feet and your walker as close to the curb as possible.    Put your weight on both legs, and then lift the walker onto the curb.    Step on the  curb with your good leg. Using the walker to support your weight, bring up your operated leg.  Walking down a curb      Move your feet and the walker as close to the curb as possible.    Lower the walker onto the ground, keeping its back legs against the curb.    Using the walker to support your weight, lower your operated leg. Then step down with your good leg.    Date Last Reviewed: 11/15/2015    8847-7552 [employer]. [address] [place] [address]. All rights reserved. This information is not intended as a substitute for professional medical care. Always follow your healthcare professional's instructions.                After Hip Replacement: When to Call Your Surgeon    It s important to follow all of your surgeon s instructions after your hip replacement surgery. If you have questions, call your doctor.  Call 911  Call 911 right away if you have any of the following:    Chest pain    Shortness of breath  When to call your doctor   Call your doctor right away if you have any of the following:    Hip pain gets worse    Pain or swelling in your calf or leg not related to your incision    Tenderness or redness in your calf    Fever of 100.4 F (38 C) or higher, or as directed by your healthcare provider    Shaking chills    Swelling or redness at the incision site gets worse    Fluid draining from the incision  Preventing infections  An infection can harm the new joint. An example of an infection is pneumonia or a bladder infection. Be sure to call your surgeon or primary care doctor if you think you have an infection. Also call if you schedule a medical or dental procedure. You may need to take antibiotics to help prevent infection.  Date Last Reviewed: 11/15/2015    0068-6048 [employer]. [address] [place] [address]. All rights reserved. This information is not intended as a substitute for professional medical care. Always follow your healthcare professional's  instructions.

## 2018-07-06 NOTE — ANESTHESIA PREPROCEDURE EVALUATION
Anesthesia Evaluation     . Pt has had prior anesthetic. Type: General           ROS/MED HX    ENT/Pulmonary:     (+)sleep apnea, uses CPAP 14 cmH2O , . .    Neurologic:  - neg neurologic ROS     Cardiovascular:     (+) hypertension----. : . . . :. .       METS/Exercise Tolerance:     Hematologic:  - neg hematologic  ROS       Musculoskeletal:  - neg musculoskeletal ROS       GI/Hepatic:  - neg GI/hepatic ROS       Renal/Genitourinary:     (+) chronic renal disease, type: CRI, Other Renal/ Genitourinary, kidney donation to brother in the past      Endo:     (+) Other Endocrine Disorder pre-diabetes.      Psychiatric:  - neg psychiatric ROS       Infectious Disease:  - neg infectious disease ROS       Malignancy:      - no malignancy   Other:    (+) No chance of pregnancy C-spine cleared: N/A, H/O Chronic Pain,no other significant disability                    Physical Exam  Normal systems: cardiovascular, pulmonary and dental    Airway   Mallampati: II  TM distance: >3 FB  Neck ROM: full    Dental     Cardiovascular       Pulmonary                     Anesthesia Plan      History & Physical Review  History and physical reviewed and following examination; no interval change.    ASA Status:  3 .    NPO Status:  > 8 hours    Plan for General with Intravenous induction. Maintenance will be Balanced.    PONV prophylaxis:  Ondansetron (or other 5HT-3) and Dexamethasone or Solumedrol       Postoperative Care  Postoperative pain management:  IV analgesics.      Consents  Anesthetic plan, risks, benefits and alternatives discussed with:  Patient.  Use of blood products discussed: Yes.   Use of blood products discussed with Patient.  Consented to blood products.  .                          .

## 2018-07-06 NOTE — PROGRESS NOTES
07/06/18 1558   Quick Adds   Type of Visit Initial PT Evaluation   Living Environment   Lives With spouse   Living Arrangements house   Home Accessibility bed and bath are not on the first floor;bed and bath on same level;stairs within home;stairs to enter home  (shower-chair)   Number of Stairs to Enter Home 2   Number of Stairs Within Home 12   Stair Railings at Home inside, present on right side   Transportation Available car;family or friend will provide   Living Environment Comment Pt lives in multi-level home with his retired wife, bed and bath located on the second floor.   Self-Care   Usual Activity Tolerance good   Current Activity Tolerance moderate   Regular Exercise no   Equipment Currently Used at Home cane, straight  (built in shower chair, SEC, walker available)   Activity/Exercise/Self-Care Comment Has been using a cane for pain relief on the right side.   Functional Level Prior   Ambulation 1-->assistive equipment  (Cane)   Transferring 1-->assistive equipment   Toileting 1-->assistive equipment   Bathing 0-->independent   Dressing 0-->independent   Eating 0-->independent   Communication 0-->understands/communicates without difficulty   Swallowing 0-->swallows foods/liquids without difficulty   Cognition 0 - no cognition issues reported   Fall history within last six months yes   Number of times patient has fallen within last six months 1   Which of the above functional risks had a recent onset or change? ambulation   Prior Functional Level Comment Patient reports having pain in right hip following right BERRY that is due to lumbar stenosis.  Has been limited in acitvity due to this pain.  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    Precautions/Limitations fall precautions   Cognitive Status Examination   Orientation orientation to person, place and time   Personal Safety and Judgment intact   Memory intact   Pain Assessment   Patient Currently in Pain Yes, see Vital Sign flowsheet  (reported mild increase in right hip pain following mobility)   Integumentary/Edema   Integumentary/Edema Comments mild edema present at insicion site   Posture    Posture Not impaired   Range of Motion (ROM)   ROM Comment WFL   Strength   Strength Comments WFL   Bed Mobility   Bed Mobility Comments Patient transferred to sitting at EOB with HOB raised and min A   Transfer Skills   Transfer Comments Patient transferred from sitting to standing with CGA and verbal cueing for correct technique   Gait   Gait Comments Patient amb 10 feet x2 with 2WW and CGA   Balance   Balance Comments CGA provided with mobility   General Therapy Interventions   Planned Therapy Interventions balance training;bed mobility training;gait training;strengthening;transfer training;home program guidelines;progressive activity/exercise   Clinical Impression   Criteria for Skilled Therapeutic Intervention yes, treatment indicated   PT Diagnosis Decreased independence with mobility   Influenced by the following impairments pain   Clinical Presentation Stable/Uncomplicated   Clinical Presentation Rationale non complex pmh, stable presentation, good social support   Clinical Decision Making (Complexity) Low complexity   Therapy Frequency` 2 times/day   Predicted Duration of Therapy Intervention (days/wks) 3 days   Anticipated Equipment Needs at Discharge front wheeled walker   Anticipated Discharge Disposition Home   Risk & Benefits of therapy have been explained Yes   Patient, Family & other staff in agreement  "with plan of care Yes   [facility] TM \"6 Clicks\"   2016, Trustees of [facility], under license to [employer].  All rights reserved.   6 Clicks Short Forms Basic Mobility Inpatient Short Form   [facility]  \"6 Clicks\" V.2 Basic Mobility Inpatient Short Form   1. Turning from your back to your side while in a flat bed without using bedrails? 4 - None   2. Moving from lying on your back to sitting on the side of a flat bed without using bedrails? 3 - A Little   3. Moving to and from a bed to a chair (including a wheelchair)? 3 - A Little   4. Standing up from a chair using your arms (e.g., wheelchair, or bedside chair)? 3 - A Little   5. To walk in hospital room? 3 - A Little   6. Climbing 3-5 steps with a railing? 2 - A Lot   Basic Mobility Raw Score (Score out of 24.Lower scores equate to lower levels of function) 18   Total Evaluation Time   Total Evaluation Time (Minutes) 5     "

## 2018-07-06 NOTE — OP NOTE
Procedure Date: 07/06/2018      DATE OF SERVICE: 07/06/2018      PREOPERATIVE DIAGNOSIS:  Degenerative joint disease, left hip.      POSTOPERATIVE DIAGNOSIS:  Degenerative joint disease, left hip.      PROCEDURE:  Left total hip arthroplasty.      ANESTHESIA:  General.      FIRST ASSISTANT:  IAM Ortiz.       INDICATIONS: This is a 73-year-old man who has had chronic increasing problems with left hip pain and has failed a nonoperative treatment program.  He has had an excellent result from previous right total hip arthroplasty.  Appropriate risks and alternatives have been discussed at length, and he has elected to proceed with surgical intervention.      DESCRIPTION OF PROCEDURE:  The patient was brought to the operating room, given a general anesthetic, positioned right side down and the left hip prepped and draped sterilely in the usual manner.      We made a standard anterolateral incision.  Dissection was carried down sharply through the fascia, fascia was split in line with its fibers.  Anterior one-half of the abductors was dissected free off the greater trochanter, capsule opened and the hip dislocated anteriorly.  Inspection of the joint revealed complete loss of articular cartilage.  The femoral neck was osteotomized at an appropriate level.      Attention was turned to the acetabulum.  Progressively larger reamers were used until we sized for a 60 mm cup.  The 60 mm R3 cup was driven into position, 45 degrees of inclination and 30 degrees of anteversion.  Excellent fixation was achieved.      Attention was turned back to the femur.  Progressively larger reamers and broaches were used until we sized for a #16 femoral component high offset.  Trial reduction was done and with a 0 neck length, 36 mm diameter head and a 20 degree build-up liner with the buildup in the superior posterior position, we had restored leg length with full range of motion, no impingement and excellent stability.      Trial  components were removed, bony surfaces water picked with antibiotic solution.  We placed the real 20 degree build-up liner with the buildup in the superior posterior position.  We drove the femur.  We retrialed the head and again chose a 0 neck length, 36 mm diameter cobalt chrome head.  This was driven onto the C taper.  Final reduction again gave excellent restoration of leg length with full range of motion and excellent stability.      Wound was irrigated copiously with antibiotic solution.  Hemostasis was obtained by electrocautery.  Closure done in layers, closing the abductors back to the greater trochanter with #1 Vicryl, fascia with #1 Vicryl, subcutaneous tissue with 2-0 Vicryl and Insorb on the skin.  Sterile compressive dressing applied.  The patient awakened and taken to the recovery room in good condition.  There were no intraoperative complications and minimal blood loss.         IRVIN VILLAREAL MD             D: 2018   T: 2018   MT: NTS      Name:     JESSEE CAMPA   MRN:      [id number]        Account:        [id number]   :      1945           Procedure Date: 2018      Document: [id number]       cc: Irvin Villareal MD

## 2018-07-06 NOTE — PLAN OF CARE
Problem: Patient Care Overview  Goal: Plan of Care/Patient Progress Review  Outcome: No Change  Pt arrived to floor at 1200 from pacu in bed. Pt has been sleeping entire shift. Able to arouse with shaking and loud voice. Unable to do any education except orienting to call light. IVF. VSS. 4 lpm NC. LS clear. BS hypo. drsg scant drainage. Capnography on. Pt told to contact staff when awake, hourly rounding done.

## 2018-07-06 NOTE — IP AVS SNAPSHOT
[facility]    [address]    [facility] [address]    Phone:  [phone]    Fax:  [phone]                                       After Visit Summary   7/6/2018    Dougie Portillo    MRN: [id number]           After Visit Summary Signature Page     I have received my discharge instructions, and my questions have been answered. I have discussed any challenges I see with this plan with the nurse or doctor.    ..........................................................................................................................................  Patient/Patient Representative Signature      ..........................................................................................................................................  Patient Representative Print Name and Relationship to Patient    ..................................................               ................................................  Date                                            Time    ..........................................................................................................................................  Reviewed by Signature/Title    ...................................................              ..............................................  Date                                                            Time

## 2018-07-07 ENCOUNTER — APPOINTMENT (OUTPATIENT)
Dept: PHYSICAL THERAPY | Facility: CLINIC | Age: 73
DRG: 470 | End: 2018-07-07
Attending: ORTHOPAEDIC SURGERY
Payer: MEDICARE

## 2018-07-07 ENCOUNTER — APPOINTMENT (OUTPATIENT)
Dept: OCCUPATIONAL THERAPY | Facility: CLINIC | Age: 73
DRG: 470 | End: 2018-07-07
Attending: ORTHOPAEDIC SURGERY
Payer: MEDICARE

## 2018-07-07 LAB — HGB BLD-MCNC: 12.1 G/DL (ref 13.3–17.7)

## 2018-07-07 PROCEDURE — 97535 SELF CARE MNGMENT TRAINING: CPT | Mod: GO

## 2018-07-07 PROCEDURE — 97165 OT EVAL LOW COMPLEX 30 MIN: CPT | Mod: GO

## 2018-07-07 PROCEDURE — 40000133 ZZH STATISTIC OT WARD VISIT

## 2018-07-07 PROCEDURE — 25000128 H RX IP 250 OP 636: Performed by: ORTHOPAEDIC SURGERY

## 2018-07-07 PROCEDURE — 97110 THERAPEUTIC EXERCISES: CPT | Mod: GP | Performed by: PHYSICAL THERAPIST

## 2018-07-07 PROCEDURE — A9270 NON-COVERED ITEM OR SERVICE: HCPCS | Mod: GY | Performed by: ORTHOPAEDIC SURGERY

## 2018-07-07 PROCEDURE — 25000132 ZZH RX MED GY IP 250 OP 250 PS 637: Mod: GY | Performed by: ORTHOPAEDIC SURGERY

## 2018-07-07 PROCEDURE — 12000000 ZZH R&B MED SURG/OB

## 2018-07-07 PROCEDURE — 97116 GAIT TRAINING THERAPY: CPT | Mod: GP | Performed by: PHYSICAL THERAPIST

## 2018-07-07 PROCEDURE — 85018 HEMOGLOBIN: CPT | Performed by: ORTHOPAEDIC SURGERY

## 2018-07-07 PROCEDURE — 36415 COLL VENOUS BLD VENIPUNCTURE: CPT | Performed by: ORTHOPAEDIC SURGERY

## 2018-07-07 PROCEDURE — 97530 THERAPEUTIC ACTIVITIES: CPT | Mod: GP | Performed by: PHYSICAL THERAPIST

## 2018-07-07 PROCEDURE — 40000193 ZZH STATISTIC PT WARD VISIT: Performed by: PHYSICAL THERAPIST

## 2018-07-07 PROCEDURE — 97530 THERAPEUTIC ACTIVITIES: CPT | Mod: GO

## 2018-07-07 RX ORDER — ASPIRIN 325 MG
325 TABLET, DELAYED RELEASE (ENTERIC COATED) ORAL DAILY
Qty: 19 TABLET | Refills: 0 | Status: SHIPPED | OUTPATIENT
Start: 2018-07-08 | End: 2018-07-27

## 2018-07-07 RX ORDER — OXYCODONE AND ACETAMINOPHEN 5; 325 MG/1; MG/1
1-2 TABLET ORAL EVERY 4 HOURS PRN
Qty: 30 TABLET | Refills: 0 | Status: SHIPPED | OUTPATIENT
Start: 2018-07-07

## 2018-07-07 RX ADMIN — CYCLOBENZAPRINE HYDROCHLORIDE 10 MG: 10 TABLET, FILM COATED ORAL at 17:35

## 2018-07-07 RX ADMIN — CEFAZOLIN SODIUM 2 G: 2 INJECTION, SOLUTION INTRAVENOUS at 00:33

## 2018-07-07 RX ADMIN — METOPROLOL TARTRATE 50 MG: 50 TABLET, FILM COATED ORAL at 08:48

## 2018-07-07 RX ADMIN — ROSUVASTATIN CALCIUM 10 MG: 5 TABLET, FILM COATED ORAL at 21:34

## 2018-07-07 RX ADMIN — OXYCODONE HYDROCHLORIDE AND ACETAMINOPHEN 1 TABLET: 5; 325 TABLET ORAL at 19:08

## 2018-07-07 RX ADMIN — OXYCODONE HYDROCHLORIDE AND ACETAMINOPHEN 2 TABLET: 5; 325 TABLET ORAL at 05:57

## 2018-07-07 RX ADMIN — SERTRALINE HYDROCHLORIDE 50 MG: 50 TABLET ORAL at 08:48

## 2018-07-07 RX ADMIN — ASPIRIN 325 MG: 325 TABLET, DELAYED RELEASE ORAL at 08:48

## 2018-07-07 RX ADMIN — OXYCODONE HYDROCHLORIDE AND ACETAMINOPHEN 1 TABLET: 5; 325 TABLET ORAL at 14:57

## 2018-07-07 NOTE — PLAN OF CARE
Problem: Patient Care Overview  Goal: Plan of Care/Patient Progress Review  Outcome: Improving  Mobility: Up with assist of one gait and walker  LS:Lung sounds clear  BS:Hypoactive bowel sounds,not passed flatus  :Voiding  CMS:Intact  DRSG:Scanty, dry drainage  PAIN:Controlled with prn Percocet  Pt alert and oriented, VSS.on regular diet

## 2018-07-07 NOTE — PLAN OF CARE
Problem: Patient Care Overview  Goal: Plan of Care/Patient Progress Review    Discharge Planner PT   Patient plan for discharge: home with spouse  Current status: Pt continuing to make progress, but safety concerns noted. Unsteady at times and needing cues for safety, high pain levels by end of session.  CGA/SBA with transfers and gait skills using FWW, CGA up/down stairs, training with spouse present this afternoon.  Barriers to return to prior living situation: safety concerns with mobility yet, will continue progress with PT  Recommendations for discharge: home with spouse, continued HEP  Rationale for recommendations: Making progress, anticipate he will continue to improve and meet mobility goals prior to d/c with continued PT.  Will need 1-2 more PT sessions tomorrow before discharging.       Entered by: David Wright 07/07/2018 3:06 PM

## 2018-07-07 NOTE — PLAN OF CARE
Problem: Patient Care Overview  Goal: Plan of Care/Patient Progress Review  Outcome: Improving  Patient A & O. VSS Afebrile, IV infusing. On RA. Capnography WNL. Bladder scanned for 549 after voiding 150. Straight cath for 500 at 2014. Patient experience severe muscle spasms received order for flexeril.  Pain managed with PO Perococet and IV Dilaudid. LS Clear. Small old drainage on dressing. Cms intact. LS clear. Up with assist of 1 gait belt and walker.

## 2018-07-07 NOTE — PROGRESS NOTES
07/07/18 1425   Quick Adds   Type of Visit Initial Occupational Therapy Evaluation   Living Environment   Lives With spouse   Living Arrangements house   Home Accessibility bed and bath are not on the first floor;bed and bath on same level;stairs within home;stairs to enter home   Number of Stairs to Enter Home 2   Number of Stairs Within Home 12   Stair Railings at Home inside, present on right side   Transportation Available car;family or friend will provide   Living Environment Comment Pt lives in multi-level home with his retired wife, bed and bath located on the second floor.   Self-Care   Dominant Hand right   Usual Activity Tolerance good   Current Activity Tolerance moderate   Regular Exercise no   Equipment Currently Used at Home cane, straight   Activity/Exercise/Self-Care Comment Has been using a cane for pain relief on the right side. Pt has higher toilets and a walk-in shower with a built in bench.   Functional Level Prior   Ambulation 1-->assistive equipment   Transferring 1-->assistive equipment   Toileting 0-->independent   Bathing 0-->independent   Dressing 0-->independent   Eating 0-->independent   Communication 0-->understands/communicates without difficulty   Swallowing 0-->swallows foods/liquids without difficulty   Cognition 0 - no cognition issues reported   Fall history within last six months no   Which of the above functional risks had a recent onset or change? 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    Precautions/Limitations fall precautions;other (see comments)  (no hip precautions secondary to surgical approach per MD)   Weight-Bearing Status - LLE weight-bearing as tolerated   General Info Comments Alert, pleasant, wife present   Cognitive Status Examination   Orientation orientation to person, place and time   Level of Consciousness alert   Cognitive Comment No cognitive deficits identified   Visual Perception   Visual Perception Wears glasses   Pain Assessment   Patient Currently in Pain Yes, see Vital Sign flowsheet  (8/10)   Range of Motion (ROM)   ROM Comment B UE WFL   Strength   Strength Comments B UE WFL   Mobility   Bed Mobility Comments Sit EOB>supine SBA   Transfer Skills   Transfer Comments CGA with 2ww   Transfer Skill: Sit to Stand   Level of [place]: Sit/Stand contact guard   Physical Assist/Nonphysical Assist: Sit/Stand supervision;verbal cues   Transfer Skill: Sit to Stand weight-bearing as tolerated   Assistive Device for Transfer: Sit/Stand rolling walker   Balance   Balance Comments per chart unsteadiness at times   Upper Body Dressing   Level of [place]: Dress Upper Body independent   Lower Body Dressing   Level of [place]: Dress Lower Body contact guard   Physical Assist/Nonphysical Assist: Dress Lower Body supervision;verbal cues   Instrumental Activities of Daily Living (IADL)   Previous Responsibilities medication management;finances;driving;yardwork   IADL Comments Pt's spouse is retired and can assist with all IADLs.   Activities of Daily Living Analysis   Impairments Contributing to Impaired Activities of Daily Living pain   General Therapy Interventions   Planned Therapy Interventions ADL retraining;progressive activity/exercise   Clinical Impression   Criteria for Skilled Therapeutic Interventions Met yes, treatment indicated   OT Diagnosis Decreased ADL/IADLs   Influenced by the following impairments pain   Assessment of Occupational Performance 3-5 Performance  "Deficits   Identified Performance Deficits ADL/IADLs: dressing, toileting, bathing, driving   Clinical Decision Making (Complexity) Low complexity   Therapy Frequency daily   Predicted Duration of Therapy Intervention (days/wks) 2 days   Anticipated Equipment Needs at Discharge bath sponge;reacher;shower chair   Anticipated Discharge Disposition Home   Risks and Benefits of Treatment have been explained. Yes   Patient, Family & other staff in agreement with plan of care Yes   Clinical Impression Comments Agreeable to therapy; has had previous BERRY in 10/17   [facility] TM \"6 Clicks\"   2016, Trustees of [facility], under license to [employer].  All rights reserved.   6 Clicks Short Forms Daily Activity Inpatient Short Form   [facility]  \"6 Clicks\" Daily Activity Inpatient Short Form   1. Putting on and taking off regular lower body clothing? 3 - A Little   2. Bathing (including washing, rinsing, drying)? 3 - A Little   3. Toileting, which includes using toilet, bedpan or urinal? 3 - A Little   4. Putting on and taking off regular upper body clothing? 4 - None   5. Taking care of personal grooming such as brushing teeth? 3 - A Little   6. Eating meals? 4 - None   Daily Activity Raw Score (Score out of 24.Lower scores equate to lower levels of function) 20   Total Evaluation Time   Total Evaluation Time (Minutes) 8     "

## 2018-07-07 NOTE — PLAN OF CARE
Problem: Patient Care Overview  Goal: Plan of Care/Patient Progress Review  Outcome: Improving  Pt slept on and off through out shift. D/t lethargy no percocet given, however is available for pain management. No flatus, is belching. Up A1, unsteady at times. Reg diet. No n/v. CMS intact. Scant, dried drainage on drsg. Voiding. Will continue to monitor.

## 2018-07-07 NOTE — PLAN OF CARE
Problem: Patient Care Overview  Goal: Plan of Care/Patient Progress Review  OT- Eval completed, tx initiated. 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 He lives in a 2-level home with his wife with the bedroom located on the upper level (12 stairs to access). PTA, he was IND in all ADL/IADLs. He owns a cane but no other AE/DME.     Discharge Planner OT   Patient plan for discharge: home with spouse A  Current status: Pt seated in w/c upon OT arrival, agreeable to session but limited to high levels of pain- pt reporting 8-9/10 pain. Educated in hip precautions for pain management however clarified pt does NOT have hip precautions secondary to surgical approach. Educated in LB dressing with AE; following instruction pt completes LB dressing Mod Ind with reacher and sock-aid; CGA during sit<>stand and in stance to pull up LB clothing. Pt ambulates CGA with 2ww from bed>chair; stand>sit CGA noted uncontrolled descent to seated position; educated on safe hand placement during functional transfers using the 2ww. Pt moves SBA from seated EOB to supine.  Barriers to return to prior living situation: pain, stairs-defer to PT  Recommendations for discharge: home with spouse A; AE/DME (reacher, long handled sponge, will assess need for shower chair)  Rationale for recommendations: Pt to benefit from continued IP OT services to maximize safety and independence in ADL and functional mobility tasks prior to safe discharge home with spouse A.       Entered by: Cheryl Conrad 07/07/2018 3:06 PM

## 2018-07-07 NOTE — PLAN OF CARE
Problem: Patient Care Overview  Goal: Plan of Care/Patient Progress Review    Discharge Planner PT   Patient plan for discharge: home with spouse  Current status: Pt making good progress, amb x 150ft with FWW and CGA, 1 loss of balance, somewhat unsteady gait at times.  Up to chair, BP stable, tolerating well.  Tolerating BERRY exercises well. Will issue FWW prior to discharge.  Barriers to return to prior living situation: none anticipated, will work on stairs with PT  Recommendations for discharge: home with spouse, continued HEP  Rationale for recommendations: Making progress, anticipate he will continue to improve and meet mobility goals prior to d/c with continued PT.       Entered by: David Wright 07/07/2018 8:41 AM

## 2018-07-08 ENCOUNTER — APPOINTMENT (OUTPATIENT)
Dept: PHYSICAL THERAPY | Facility: CLINIC | Age: 73
DRG: 470 | End: 2018-07-08
Attending: ORTHOPAEDIC SURGERY
Payer: MEDICARE

## 2018-07-08 ENCOUNTER — APPOINTMENT (OUTPATIENT)
Dept: OCCUPATIONAL THERAPY | Facility: CLINIC | Age: 73
DRG: 470 | End: 2018-07-08
Attending: ORTHOPAEDIC SURGERY
Payer: MEDICARE

## 2018-07-08 VITALS
HEIGHT: 73 IN | BODY MASS INDEX: 31.81 KG/M2 | OXYGEN SATURATION: 96 % | RESPIRATION RATE: 16 BRPM | HEART RATE: 67 BPM | DIASTOLIC BLOOD PRESSURE: 58 MMHG | WEIGHT: 240 LBS | TEMPERATURE: 99.4 F | SYSTOLIC BLOOD PRESSURE: 106 MMHG

## 2018-07-08 LAB — HGB BLD-MCNC: 12.6 G/DL (ref 13.3–17.7)

## 2018-07-08 PROCEDURE — A9270 NON-COVERED ITEM OR SERVICE: HCPCS | Mod: GY | Performed by: ORTHOPAEDIC SURGERY

## 2018-07-08 PROCEDURE — 97535 SELF CARE MNGMENT TRAINING: CPT | Mod: GO | Performed by: OCCUPATIONAL THERAPIST

## 2018-07-08 PROCEDURE — 40000133 ZZH STATISTIC OT WARD VISIT: Performed by: OCCUPATIONAL THERAPIST

## 2018-07-08 PROCEDURE — 36415 COLL VENOUS BLD VENIPUNCTURE: CPT | Performed by: ORTHOPAEDIC SURGERY

## 2018-07-08 PROCEDURE — 97116 GAIT TRAINING THERAPY: CPT | Mod: GP | Performed by: PHYSICAL THERAPIST

## 2018-07-08 PROCEDURE — 97110 THERAPEUTIC EXERCISES: CPT | Mod: GP | Performed by: PHYSICAL THERAPIST

## 2018-07-08 PROCEDURE — 85018 HEMOGLOBIN: CPT | Performed by: ORTHOPAEDIC SURGERY

## 2018-07-08 PROCEDURE — 25000132 ZZH RX MED GY IP 250 OP 250 PS 637: Mod: GY | Performed by: ORTHOPAEDIC SURGERY

## 2018-07-08 PROCEDURE — 97530 THERAPEUTIC ACTIVITIES: CPT | Mod: GP | Performed by: PHYSICAL THERAPIST

## 2018-07-08 PROCEDURE — 40000193 ZZH STATISTIC PT WARD VISIT: Performed by: PHYSICAL THERAPIST

## 2018-07-08 RX ADMIN — CYCLOBENZAPRINE HYDROCHLORIDE 10 MG: 10 TABLET, FILM COATED ORAL at 00:13

## 2018-07-08 RX ADMIN — SERTRALINE HYDROCHLORIDE 50 MG: 50 TABLET ORAL at 09:12

## 2018-07-08 RX ADMIN — OXYCODONE HYDROCHLORIDE AND ACETAMINOPHEN 1 TABLET: 5; 325 TABLET ORAL at 13:26

## 2018-07-08 RX ADMIN — METOPROLOL TARTRATE 50 MG: 50 TABLET, FILM COATED ORAL at 09:11

## 2018-07-08 RX ADMIN — LISINOPRIL 10 MG: 10 TABLET ORAL at 09:12

## 2018-07-08 RX ADMIN — OXYCODONE HYDROCHLORIDE AND ACETAMINOPHEN 1 TABLET: 5; 325 TABLET ORAL at 09:12

## 2018-07-08 RX ADMIN — ASPIRIN 325 MG: 325 TABLET, DELAYED RELEASE ORAL at 09:12

## 2018-07-08 RX ADMIN — OXYCODONE HYDROCHLORIDE AND ACETAMINOPHEN 1 TABLET: 5; 325 TABLET ORAL at 00:13

## 2018-07-08 NOTE — PLAN OF CARE
Problem: Patient Care Overview  Goal: Plan of Care/Patient Progress Review    Discharge Planner PT   Patient plan for discharge: home with spouse, possibly today  Current status: Making progress, but continues to demonstrate mild unsteady gait at times and needing reminders for safety with mobility skills.  Amb x 150ft with FWW and up/down stairs with CGA/SBA.  Tolerating BERRY exercises well.  FWW issued for home.  Will review mobility skills, HEP, and stairs with spouse this afternoon.    Addendum PM Session:  Spouse present.  Reviewed HEP and safety with all mobility skills.  Practiced stairs, CGA/SBA.  Somewhat unsteady gait at times, cues for posture and keeping walker close to him. Spouse reports he has been walking this way for quite some time.  Advised f/u with OP PT soon to work on strength, balance, gait training.  Still at risk for falls.    Barriers to return to prior living situation: Continued balance concerns with gait but this will likely persist at discharge  Recommendations for discharge: home with increased spouse supervision, Ax1 on stairs for safety  Rationale for recommendations: Making progress, anticipate he will be able to d/c home with spouse support with 1 more PT session to reinforce safety with mobility skills.       Entered by: David Wright 07/08/2018 10:41 AM     Physical Therapy Discharge Summary    Reason for therapy discharge:    Discharged to home.    Progress towards therapy goal(s). See goals on Care Plan in [facility] electronic health record for goal details.  Goals partially met.  Barriers to achieving goals:   limited tolerance for therapy and discharge from facility.    Therapy recommendation(s):    Continue home exercise program.  OP PT referral to improve strength, mobility, gait stability, decrease risk for falls.

## 2018-07-08 NOTE — PROGRESS NOTES
"Ortho Rounding Note    S:doing well, very comfortable.  Knowledgeable of the 'routine' from his right BERRY last year.    O:  Vital signs:   Blood pressure 126/56, pulse 67, temperature 99.2  F (37.3  C), temperature source Oral, resp. rate 16, height 1.854 m (6' 1\"), weight 108.9 kg (240 lb), SpO2 98 %.  Estimated body mass index is 31.66 kg/(m^2) as calculated from the following:    Height as of this encounter: 1.854 m (6' 1\").    Weight as of this encounter: 108.9 kg (240 lb).      Intake/Output Summary (Last 24 hours) at 07/08/18 0719  Last data filed at 07/08/18 0631   Gross per 24 hour   Intake              750 ml   Output             2875 ml   Net            -2125 ml           Dressings c/d/i left hip  5/5 motor and SPLT in BL UE and LE    A:  POD #2 s/p left BERRY    P:  General: progressing very well  Pain: PO  Act: up ad nick, with therapy  DVT: Mech, ASA  ID: routine postop abx to be completed 24 hours after surgery  Dispo: OK to DC once passes PT, medically stable, tolerating food, urinating.  Plan for DC home today. Dr. Caldwell has put in DC orders    Haile Rincon MD    "

## 2018-07-08 NOTE — PLAN OF CARE
Problem: Patient Care Overview  Goal: Plan of Care/Patient Progress Review    Discharge Planner OT   Patient plan for discharge: home with spouse A  Current status: OT: Pt up in room, standing to use the urinal when I entered.  Pt in bare feet, had him try to don socks.  Not able to bed to get them on, complaining of pain in left hip.  Worked with sock aide to don socks.  Got left one on but still in pain, seeming to have trouble sitting fuilly on left hip.  Needed right one put on for him.  Worked at walk-in shower transfer and car transfer using shower and drop down bench in his room.  Able to get in the shower alright although picked up the walker entirely off the floor to step in.  Needed technique for car transfer repeated 3x as he was just stepping in and not getting that he had to sit first before getting his legs in.  Eventually able to to it, performed transfer x2.  Barriers to return to prior living situation: pain, stairs-defer to PT  Recommendations for discharge: home with spouse A; AE/DME (reacher, long handled sponge, will assess need for shower chair)  Rationale for recommendations: Pt to benefit from continued IP OT services to maximize safety and independence in ADL and functional mobility tasks prior to safe discharge home with spouse A.       Entered by: Zay Manjarrez 07/08/2018 10:18 AM

## 2018-07-08 NOTE — PLAN OF CARE
Problem: Patient Care Overview  Goal: Plan of Care/Patient Progress Review  Outcome: Improving  Mobility: Assist of one gait and walker  LS:Lung sounds clear  BS:Active bowel sounds, passing flatus and belching  :Voiding  CMS:Intact  DRSG:Scanty, dry drainage  PAIN:Controlled with prn Percocet  Pt alert and oriented, afebrile Temp.on regular diet.Saline lock. Likely to d/c home today.

## 2018-07-08 NOTE — PLAN OF CARE
Problem: Patient Care Overview  Goal: Plan of Care/Patient Progress Review  Outcome: Adequate for Discharge Date Met: 07/08/18  Pt up A1, voiding, passing flatus. Discharge education completed, extra reinforcement on fall prevention-packet given. meds given and discussed. AVS signed. All belongings collected and sent home with pt. Spouse at bedside, will transport pt home. Adequate for discharge.

## 2018-07-08 NOTE — DISCHARGE INSTRUCTIONS
Your dressing is waterproof, you may take a shower. When done pat dry, do not rub. If dressing becomes saturated, or starts to peel please call your surgeons office for dressing change instructions. DO NOT submerge dressing or incision in water, NO baths, pools, hotubs, or swimming.

## 2018-07-08 NOTE — PLAN OF CARE
Problem: Patient Care Overview  Goal: Plan of Care/Patient Progress Review  Outcome: Improving  Patient A & O. VSS Afebrile. Saline locked. Up with 1 assist gait belt and walker. Needs reminders to walk slower with the walker as he is unsteady at times. Voiding WNL. Pain managed with Percocet and flexeril. Plan to dc home tomorrow.

## 2018-07-09 NOTE — PLAN OF CARE
Occupational Therapy Discharge Summary    Reason for therapy discharge:    Discharged to home.    Progress towards therapy goal(s). See goals on Care Plan in [facility] electronic health record for goal details.  Goals met    Therapy recommendation(s):

## 2019-06-03 DIAGNOSIS — R30.0 DYSURIA: Primary | ICD-10-CM

## 2019-06-05 ENCOUNTER — OFFICE VISIT (OUTPATIENT)
Dept: UROLOGY | Facility: CLINIC | Age: 74
End: 2019-06-05
Payer: COMMERCIAL

## 2019-06-05 VITALS
HEART RATE: 60 BPM | DIASTOLIC BLOOD PRESSURE: 60 MMHG | SYSTOLIC BLOOD PRESSURE: 106 MMHG | WEIGHT: 250 LBS | HEIGHT: 73 IN | BODY MASS INDEX: 33.13 KG/M2

## 2019-06-05 DIAGNOSIS — R30.0 DYSURIA: ICD-10-CM

## 2019-06-05 DIAGNOSIS — N40.0 ENLARGED PROSTATE: Primary | ICD-10-CM

## 2019-06-05 DIAGNOSIS — R10.30 INGUINAL PAIN, UNSPECIFIED LATERALITY: ICD-10-CM

## 2019-06-05 LAB
ALBUMIN UR-MCNC: NEGATIVE MG/DL
APPEARANCE UR: CLEAR
BILIRUB UR QL STRIP: NEGATIVE
COLOR UR AUTO: YELLOW
GLUCOSE UR STRIP-MCNC: NEGATIVE MG/DL
HGB UR QL STRIP: NEGATIVE
KETONES UR STRIP-MCNC: NEGATIVE MG/DL
LEUKOCYTE ESTERASE UR QL STRIP: ABNORMAL
NITRATE UR QL: NEGATIVE
PH UR STRIP: 7 PH (ref 5–7)
RESIDUAL VOLUME (RV) (EXTERNAL): 106
SOURCE: ABNORMAL
SP GR UR STRIP: 1.01 (ref 1–1.03)
UROBILINOGEN UR STRIP-ACNC: 0.2 EU/DL (ref 0.2–1)

## 2019-06-05 PROCEDURE — 81003 URINALYSIS AUTO W/O SCOPE: CPT | Mod: QW | Performed by: UROLOGY

## 2019-06-05 PROCEDURE — 87086 URINE CULTURE/COLONY COUNT: CPT | Performed by: UROLOGY

## 2019-06-05 PROCEDURE — 99203 OFFICE O/P NEW LOW 30 MIN: CPT | Mod: 25 | Performed by: UROLOGY

## 2019-06-05 PROCEDURE — 51798 US URINE CAPACITY MEASURE: CPT | Performed by: UROLOGY

## 2019-06-05 RX ORDER — HYDROCORTISONE 2.5 %
CREAM (GRAM) TOPICAL
COMMUNITY
Start: 2019-03-27

## 2019-06-05 ASSESSMENT — MIFFLIN-ST. JEOR: SCORE: 1932.87

## 2019-06-05 ASSESSMENT — PAIN SCALES - GENERAL: PAINLEVEL: NO PAIN (1)

## 2019-06-05 NOTE — LETTER
6/5/2019       RE: Dougie Portillo  [address] [facility] [id number]     Dear Colleague,    Thank you for referring your patient, Dougie Portillo, to the [facility] [place] [facility]. Please see a copy of my visit note below.    [facility]  Main Office: [address]  [address]  [place] [address]       CHIEF COMPLAINT:  Groin pain    HISTORY:   I was asked by Dr. Mccoy with [facility] to see this 74-year-old gentleman who has a prior history of prostatitis.  He has been treated for this in the past. He recently had a small amount of groin pain and wondered if possibly he had prostatitis. Reportedly his evaluation was normal.  He says that his symptoms have improved since that time. He says he occasionally has mild groin pain when sitting down. He has no urinary complaints or dysuria. No frequency or urgency. No history of gross hematuria or infections.  He is a nonsmoker.      PAST MEDICAL HISTORY:   Past Medical History:   Diagnosis Date     DJD (degenerative joint disease)     hip     High cholesterol      Hypertension      Mumps      Other chronic pain     JOint pain for 3 years     Palpitations      Renal disease     Kidney donor to brother     Sleep apnea     Uses a CPAP franklin bring on  the day of surgery       PAST SURGICAL HISTORY:   Past Surgical History:   Procedure Laterality Date     APPENDECTOMY       ARTHROPLASTY HIP Right 10/18/2017    Procedure: ARTHROPLASTY HIP;  Right total hip arthroplasty;  Surgeon: Jose Caldwell MD;  Location: [facility] OR     ARTHROPLASTY HIP Left 7/6/2018    Procedure: ARTHROPLASTY HIP;  Left total hip arthroplasty;  Surgeon: Jose Caldwell MD;  Location: [facility] OR     BIOPSY      Testicle ? side--was negative.     NEPHRECTOMY       NEPHRECTOMY RT/LT Left     Donated kidney to brother       FAMILY HISTORY:   Family History   Problem Relation Age of Onset     Diabetes Mother       "Hypertension Mother      Hypertension Father        SOCIAL HISTORY:   Social History     Tobacco Use     Smoking status: Never Smoker     Smokeless tobacco: Never Used   Substance Use Topics     Alcohol use: Yes     Comment: 1 drink every few months.        No Known Allergies      Current Outpatient Medications:      ASPIRIN PO, Take 81 mg by mouth daily, Disp: , Rfl:      hydrocortisone 2.5 % cream, , Disp: , Rfl:      LISINOPRIL PO, Take 10 mg by mouth daily, Disp: , Rfl:      metoprolol tartrate (LOPRESSOR) 50 MG tablet, Take 50 mg by mouth, Disp: , Rfl:      order for DME, Equipment being ordered: Walker Wheels () and Walker () Treatment Diagnosis: difficulty with ambulation, Disp: 1 each, Rfl: 0     rosuvastatin (CRESTOR) 10 MG tablet, Take 10 mg by mouth daily, Disp: , Rfl:      Sertraline HCl (ZOLOFT PO), Take 50 mg by mouth daily, Disp: , Rfl:      oxyCODONE-acetaminophen (PERCOCET) 5-325 MG per tablet, Take 1-2 tablets by mouth every 4 hours as needed for moderate to severe pain (Patient not taking: Reported on 6/5/2019), Disp: 30 tablet, Rfl: 0    Review Of Systems:  Skin: No rash, pruritis, or skin pigmentation  Eyes: No changes in vision  Ears/Nose/Throat: No changes in hearing, no nosebleeds  Respiratory: No shortness of breath, dyspnea on exertion, cough, or hemoptysis  Cardiovascular: No chest pain or palpitations  Gastrointestinal: No diarrhea or constipation. No abdominal pain. No hematochezia  Genitourinary: see HPI  Musculoskeletal: No pain or swelling of joints, normal range of motion  Neurologic: No weakness or tremors  Psychiatric: No recent changes in memory or mood  Hematologic/Lymphatic/Immunologic: No easy bruising or enlarged lymph nodes  Endocrine: No weight gain or loss      PHYSICAL EXAM:    /60   Pulse 60   Ht 1.854 m (6' 1\")   Wt 113.4 kg (250 lb)   BMI 32.98 kg/m     General appearance: In NAD, conversant  HEENT: Normocephalic and atraumatic, anicteric " sclera  Cardiovascular: Not examined  Respiratory: normal, non-labored breathing  Gastrointestinal: negative, Abdomen soft, non-tender, and non-distended.   Musculoskeletal: Not Examined  Peripheral Vascular/extremity: No peripheral edema  Skin: Normal temperature, turgor, and texture. No rash  Psychiatric: Appropriate affect, alert and oriented to person, place, and time    Penis: Normal  Scrotal skin: Normal, no lesions  Testicles: Normal to palpation bilaterally  Epididymis: Normal to palpation bilaterally  Lymphatic: Normal inguinal lymph nodes  Digital Rectal Exam: the prostate does not feel enlarged, benign and symmetric to palpation    Cystoscopy: Not done      PSA:     UA RESULTS:  Recent Labs   Lab Test 06/05/19  1053   COLOR Yellow   APPEARANCE Clear   URINEGLC Negative   URINEBILI Negative   URINEKETONE Negative   SG 1.015   UBLD Negative   URINEPH 7.0   PROTEIN Negative   UROBILINOGEN 0.2   NITRITE Negative   LEUKEST Trace*       Bladder Scan: 106mL    Other Labs:      Imaging Studies: None      CLINICAL IMPRESSION:   Groin pain    PLAN:   He has occasional mild groin pain that is not terribly bothersome to him.  His urologic evaluation is normal today. There is no evidence of prostatitis on exam. He was provided reassurance and he feels comfortable with this. I do not see any PSA information for him.  I will have him get a PSA checked at the lab next week and then I will contact him with those results.      Familia Ludwig MD      Again, thank you for allowing me to participate in the care of your patient.      Sincerely,    Familia Ludwig MD

## 2019-06-05 NOTE — NURSING NOTE
Has been  Having discomfort  Almost burning sensation in groin only when sitting for th elast 4 months. PVR  By bladder scan 106[facility] Hospit[facility]irill LPN

## 2019-06-05 NOTE — PROGRESS NOTES
[facility]  Main Office: [address]  [address]  [place] [address]       CHIEF COMPLAINT:  Groin pain    HISTORY:   I was asked by Dr. Mccoy with [facility] to see this 74-year-old gentleman who has a prior history of prostatitis.  He has been treated for this in the past. He recently had a small amount of groin pain and wondered if possibly he had prostatitis. Reportedly his evaluation was normal.  He says that his symptoms have improved since that time. He says he occasionally has mild groin pain when sitting down. He has no urinary complaints or dysuria. No frequency or urgency. No history of gross hematuria or infections.  He is a nonsmoker.      PAST MEDICAL HISTORY:   Past Medical History:   Diagnosis Date     DJD (degenerative joint disease)     hip     High cholesterol      Hypertension      Mumps      Other chronic pain     JOint pain for 3 years     Palpitations      Renal disease     Kidney donor to brother     Sleep apnea     Uses a CPAP franklin bring on  the day of surgery       PAST SURGICAL HISTORY:   Past Surgical History:   Procedure Laterality Date     APPENDECTOMY       ARTHROPLASTY HIP Right 10/18/2017    Procedure: ARTHROPLASTY HIP;  Right total hip arthroplasty;  Surgeon: Jose Caldwell MD;  Location: [facility] OR     ARTHROPLASTY HIP Left 7/6/2018    Procedure: ARTHROPLASTY HIP;  Left total hip arthroplasty;  Surgeon: Jose Caldwell MD;  Location: [facility] OR     BIOPSY      Testicle ? side--was negative.     NEPHRECTOMY       NEPHRECTOMY RT/LT Left     Donated kidney to brother       FAMILY HISTORY:   Family History   Problem Relation Age of Onset     Diabetes Mother      Hypertension Mother      Hypertension Father        SOCIAL HISTORY:   Social History     Tobacco Use     Smoking status: Never Smoker     Smokeless tobacco: Never Used   Substance Use Topics     Alcohol use: Yes     Comment: 1 drink every few months.        No Known Allergies      Current Outpatient  "Medications:      ASPIRIN PO, Take 81 mg by mouth daily, Disp: , Rfl:      hydrocortisone 2.5 % cream, , Disp: , Rfl:      LISINOPRIL PO, Take 10 mg by mouth daily, Disp: , Rfl:      metoprolol tartrate (LOPRESSOR) 50 MG tablet, Take 50 mg by mouth, Disp: , Rfl:      order for DME, Equipment being ordered: Walker Wheels () and Walker () Treatment Diagnosis: difficulty with ambulation, Disp: 1 each, Rfl: 0     rosuvastatin (CRESTOR) 10 MG tablet, Take 10 mg by mouth daily, Disp: , Rfl:      Sertraline HCl (ZOLOFT PO), Take 50 mg by mouth daily, Disp: , Rfl:      oxyCODONE-acetaminophen (PERCOCET) 5-325 MG per tablet, Take 1-2 tablets by mouth every 4 hours as needed for moderate to severe pain (Patient not taking: Reported on 6/5/2019), Disp: 30 tablet, Rfl: 0    Review Of Systems:  Skin: No rash, pruritis, or skin pigmentation  Eyes: No changes in vision  Ears/Nose/Throat: No changes in hearing, no nosebleeds  Respiratory: No shortness of breath, dyspnea on exertion, cough, or hemoptysis  Cardiovascular: No chest pain or palpitations  Gastrointestinal: No diarrhea or constipation. No abdominal pain. No hematochezia  Genitourinary: see HPI  Musculoskeletal: No pain or swelling of joints, normal range of motion  Neurologic: No weakness or tremors  Psychiatric: No recent changes in memory or mood  Hematologic/Lymphatic/Immunologic: No easy bruising or enlarged lymph nodes  Endocrine: No weight gain or loss      PHYSICAL EXAM:    /60   Pulse 60   Ht 1.854 m (6' 1\")   Wt 113.4 kg (250 lb)   BMI 32.98 kg/m    General appearance: In NAD, conversant  HEENT: Normocephalic and atraumatic, anicteric sclera  Cardiovascular: Not examined  Respiratory: normal, non-labored breathing  Gastrointestinal: negative, Abdomen soft, non-tender, and non-distended.   Musculoskeletal: Not Examined  Peripheral Vascular/extremity: No peripheral edema  Skin: Normal temperature, turgor, and texture. No rash  Psychiatric: " Appropriate affect, alert and oriented to person, place, and time    Penis: Normal  Scrotal skin: Normal, no lesions  Testicles: Normal to palpation bilaterally  Epididymis: Normal to palpation bilaterally  Lymphatic: Normal inguinal lymph nodes  Digital Rectal Exam: the prostate does not feel enlarged, benign and symmetric to palpation    Cystoscopy: Not done      PSA:     UA RESULTS:  Recent Labs   Lab Test 06/05/19  1053   COLOR Yellow   APPEARANCE Clear   URINEGLC Negative   URINEBILI Negative   URINEKETONE Negative   SG 1.015   UBLD Negative   URINEPH 7.0   PROTEIN Negative   UROBILINOGEN 0.2   NITRITE Negative   LEUKEST Trace*       Bladder Scan: 106mL    Other Labs:      Imaging Studies: None      CLINICAL IMPRESSION:   Groin pain    PLAN:   He has occasional mild groin pain that is not terribly bothersome to him.  His urologic evaluation is normal today. There is no evidence of prostatitis on exam. He was provided reassurance and he feels comfortable with this. I do not see any PSA information for him.  I will have him get a PSA checked at the lab next week and then I will contact him with those results.      Familia Ludwig MD

## 2019-06-06 LAB
BACTERIA SPEC CULT: NO GROWTH
Lab: NORMAL
SPECIMEN SOURCE: NORMAL

## 2019-06-11 DIAGNOSIS — N40.0 ENLARGED PROSTATE: ICD-10-CM

## 2019-06-11 PROCEDURE — 84153 ASSAY OF PSA TOTAL: CPT | Performed by: UROLOGY

## 2019-06-11 PROCEDURE — 36415 COLL VENOUS BLD VENIPUNCTURE: CPT | Performed by: UROLOGY

## 2019-06-12 LAB — PSA SERPL-MCNC: 0.46 UG/L (ref 0–4)

## 2021-06-15 NOTE — PROGRESS NOTES
10/19/17 1300   Quick Adds   Type of Visit Initial Occupational Therapy Evaluation   Living Environment   Lives With spouse   Living Arrangements house   Home Accessibility bed and bath are not on the first floor;bed and bath on same level;stairs within home;stairs to enter home  (shower-chair)   Number of Stairs to Enter Home 2   Number of Stairs Within Home 12   Transportation Available car;family or friend will provide   Living Environment Comment Pt lives in multi-level home with his retired wife, bed and bath located on the second floor.    Self-Care   Usual Activity Tolerance good   Current Activity Tolerance moderate   Equipment Currently Used at Home none  (built in shower chair, SEC, walker available)   Functional Level Prior   Ambulation 0-->independent   Transferring 0-->independent   Toileting 0-->independent   Bathing 0-->independent   Dressing 0-->independent   Eating 0-->independent   Communication 0-->understands/communicates without difficulty   Swallowing 0-->swallows foods/liquids without difficulty   Cognition 0 - no cognition issues reported   Fall history within last six months yes   Number of times patient has fallen within last six months 2   Which of the above functional risks had a recent onset or change? transferring;toileting;bathing;dressing   Prior Functional Level Comment Pt IND in all self cares prior to admission   General Information   Onset of Illness/Injury or Date of Surgery - Date 10/18/17   Referring Physician Jose Caldwell MD   Patient/Family Goals Statement To return home   Additional Occupational Profile Info/Pertinent History of Current Problem Patient is a 72 y.o. male seen POD #1 s/p R BERRY. PMH includes HTN, HLD, Sleep apnea and Borderline DM    Precautions/Limitations fall precautions;other (see comments)  (no hip precautions per surgical approach)   Weight-Bearing Status - RLE weight-bearing as tolerated   General Observations Pleasant, alert, agreeable to therapy    Cognitive Status Examination   Orientation orientation to person, place and time   Level of Consciousness alert   Able to Follow Commands WNL/WFL   Personal Safety (Cognitive) decreased awareness, need for safety;decreased awareness, need for assist   Memory intact   Attention No deficits were identified   Organization/Problem Solving No deficits were identified   Visual Perception   Visual Perception Wears glasses   Sensory Examination   Sensory Comments Some tingling in B LE   Pain Assessment   Patient Currently in Pain Yes, see Vital Sign flowsheet   Integumentary/Edema   Integumentary/Edema no deficits were identifed   Posture   Posture not impaired   Range of Motion (ROM)   ROM Comment L LE WFL, R impaired d/t surgery   Strength   Strength Comments L LE WFL, R impaired d/t surgery   Hand Strength   Hand Strength Comments Intact   Muscle Tone Assessment   Muscle Tone Quick Adds No deficits were identified   Coordination   Upper Extremity Coordination No deficits were identified   Mobility   Bed Mobility Comments Supine>sit EOB MIN A   Transfer Skill: Bed to Chair/Chair to Bed   Level of [place]: Bed to Chair contact guard   Physical Assist/Nonphysical Assist: Bed to Chair supervision;verbal cues;1 person assist   Weight-Bearing Restrictions weight-bearing as tolerated   Assistive Device - Transfer Skill Bed to Chair Chair to Bed Rehab Eval standard walker   Transfer Skill: Sit to Stand   Level of [place]: Sit/Stand contact guard   Physical Assist/Nonphysical Assist: Sit/Stand supervision;verbal cues;1 person assist   Transfer Skill: Sit to Stand weight-bearing as tolerated   Assistive Device for Transfer: Sit/Stand standard walker   Transfer Skill: Toilet Transfer   Level of [place]: Toilet minimum assist (75% patients effort)   Physical Assist/Nonphysical Assist: Toilet supervision;verbal cues;1 person assist   Weight-Bearing Restrictions: Toilet weight-bearing as tolerated   Assistive Device  standard walker;grab bars   Toilet Transfer Skill Comments treatment iniated, see OT daily note for details   Tub/Shower Transfer   Tub/Shower Transfer Comments treatment initiated, see OT daily note for details   Balance   Balance Comments Moderate unsteadiness noted during functional transfers   Upper Body Dressing   Level of [place]: Dress Upper Body stand-by assist   Physical Assist/Nonphysical Assist: Dress Upper Body supervision;other (see comments)  (A to manage telemetry box)   Lower Body Dressing   Level of [place]: Dress Lower Body contact guard   Physical Assist/Nonphysical Assist: Dress Lower Body supervision;verbal cues   Assistive Device reacher;sock-aid   Instrumental Activities of Daily Living (IADL)   Previous Responsibilities yardwork;laundry;driving;finances;medication management;housekeeping   IADL Comments Pt's wife will A with IADLs as needed   Activities of Daily Living Analysis   Impairments Contributing to Impaired Activities of Daily Living balance impaired;pain;strength decreased   General Therapy Interventions   Planned Therapy Interventions ADL retraining;progressive activity/exercise   Clinical Impression   Criteria for Skilled Therapeutic Interventions Met yes, treatment indicated   OT Diagnosis Decreased ADL and IADLs   Influenced by the following impairments balance impaired;pain;strength decreased   Assessment of Occupational Performance 5 or more Performance Deficits   Identified Performance Deficits ADLs and IADLs: dressing, bathing, toileting, g&h, driving, household mgmt, yardwork   Clinical Decision Making (Complexity) Low complexity   Therapy Frequency daily   Predicted Duration of Therapy Intervention (days/wks) 3 days   Anticipated Equipment Needs at Discharge bath sponge;reacher;sock aide;raised toilet seat   Anticipated Discharge Disposition Home   Risks and Benefits of Treatment have been explained. Yes   Patient, Family & other staff in agreement with plan of  "care Yes   Clinical Impression Comments Pleasant, agreeable to therapy, motivated to return to baseline   [facility] AM-PAC TM \"6 Clicks\"   2016, Trustees of [facility], under license to [employer].  All rights reserved.   6 Clicks Short Forms Daily Activity Inpatient Short Form   [facility] AM-PAC  \"6 Clicks\" Daily Activity Inpatient Short Form   1. Putting on and taking off regular lower body clothing? 3 - A Little   2. Bathing (including washing, rinsing, drying)? 3 - A Little   3. Toileting, which includes using toilet, bedpan or urinal? 3 - A Little   4. Putting on and taking off regular upper body clothing? 4 - None   5. Taking care of personal grooming such as brushing teeth? 3 - A Little   6. Eating meals? 4 - None   Daily Activity Raw Score (Score out of 24.Lower scores equate to lower levels of function) 20   Total Evaluation Time   Total Evaluation Time (Minutes) 10     " oriented to person, place, time and situation

## 2021-12-13 NOTE — PROVIDER NOTIFICATION
Documentation of the ultasound findings, images, and interpretations will be available in the patient's Viewpoint report located in the Chart Review Imaging tab in [employer].     No pre op creatine needed per Dr. Alford.

## 2024-06-28 ENCOUNTER — APPOINTMENT (OUTPATIENT)
Dept: URBAN - METROPOLITAN AREA CLINIC 253 | Age: 79
Setting detail: DERMATOLOGY
End: 2024-07-01

## 2024-06-28 VITALS — WEIGHT: 225 LBS | RESPIRATION RATE: 14 BRPM | HEIGHT: 60 IN

## 2024-06-28 DIAGNOSIS — D22 MELANOCYTIC NEVI: ICD-10-CM

## 2024-06-28 DIAGNOSIS — L82.1 OTHER SEBORRHEIC KERATOSIS: ICD-10-CM

## 2024-06-28 DIAGNOSIS — D18.0 HEMANGIOMA: ICD-10-CM

## 2024-06-28 DIAGNOSIS — L81.4 OTHER MELANIN HYPERPIGMENTATION: ICD-10-CM

## 2024-06-28 DIAGNOSIS — D69.2 OTHER NONTHROMBOCYTOPENIC PURPURA: ICD-10-CM

## 2024-06-28 DIAGNOSIS — Z71.89 OTHER SPECIFIED COUNSELING: ICD-10-CM

## 2024-06-28 PROBLEM — D22.5 MELANOCYTIC NEVI OF TRUNK: Status: ACTIVE | Noted: 2024-06-28

## 2024-06-28 PROBLEM — D18.01 HEMANGIOMA OF SKIN AND SUBCUTANEOUS TISSUE: Status: ACTIVE | Noted: 2024-06-28

## 2024-06-28 PROCEDURE — OTHER MIPS QUALITY: OTHER

## 2024-06-28 PROCEDURE — OTHER COUNSELING: OTHER

## 2024-06-28 PROCEDURE — 99203 OFFICE O/P NEW LOW 30 MIN: CPT

## 2024-06-28 ASSESSMENT — LOCATION DETAILED DESCRIPTION DERM
LOCATION DETAILED: RIGHT DISTAL DORSAL FOREARM
LOCATION DETAILED: INFERIOR THORACIC SPINE
LOCATION DETAILED: RIGHT PROXIMAL DORSAL FOREARM

## 2024-06-28 ASSESSMENT — LOCATION SIMPLE DESCRIPTION DERM
LOCATION SIMPLE: RIGHT FOREARM
LOCATION SIMPLE: UPPER BACK

## 2024-06-28 ASSESSMENT — LOCATION ZONE DERM
LOCATION ZONE: ARM
LOCATION ZONE: TRUNK

## 2024-06-28 NOTE — HPI: FULL BODY SKIN EXAMINATION
What Is The Reason For Today's Visit?: Full Body Skin Examination
What Is The Reason For Today's Visit? (Being Monitored For X): concerning skin lesions on an annual basis
Additional History: He expresses concerns about a lesion located on the right forearm. Patient noticed a new lesion a few weeks ago. He states it has since disappeared. He reports that it looked like it had been bubbling. He states that there was no pain and just appeared. He states there are more around the arm. He does not take blood thinners or baby aspirin.

## 2025-07-18 ENCOUNTER — APPOINTMENT (OUTPATIENT)
Dept: URBAN - METROPOLITAN AREA CLINIC 253 | Age: 80
Setting detail: DERMATOLOGY
End: 2025-07-21

## 2025-07-18 VITALS — WEIGHT: 235 LBS | HEIGHT: 72 IN

## 2025-07-18 DIAGNOSIS — D18.0 HEMANGIOMA: ICD-10-CM

## 2025-07-18 DIAGNOSIS — L82.1 OTHER SEBORRHEIC KERATOSIS: ICD-10-CM

## 2025-07-18 DIAGNOSIS — L57.8 OTHER SKIN CHANGES DUE TO CHRONIC EXPOSURE TO NONIONIZING RADIATION: ICD-10-CM

## 2025-07-18 DIAGNOSIS — Z71.89 OTHER SPECIFIED COUNSELING: ICD-10-CM

## 2025-07-18 DIAGNOSIS — D22 MELANOCYTIC NEVI: ICD-10-CM

## 2025-07-18 DIAGNOSIS — L28.0 LICHEN SIMPLEX CHRONICUS: ICD-10-CM

## 2025-07-18 PROBLEM — D22.72 MELANOCYTIC NEVI OF LEFT LOWER LIMB, INCLUDING HIP: Status: ACTIVE | Noted: 2025-07-18

## 2025-07-18 PROBLEM — D18.01 HEMANGIOMA OF SKIN AND SUBCUTANEOUS TISSUE: Status: ACTIVE | Noted: 2025-07-18

## 2025-07-18 PROBLEM — D22.61 MELANOCYTIC NEVI OF RIGHT UPPER LIMB, INCLUDING SHOULDER: Status: ACTIVE | Noted: 2025-07-18

## 2025-07-18 PROBLEM — D22.5 MELANOCYTIC NEVI OF TRUNK: Status: ACTIVE | Noted: 2025-07-18

## 2025-07-18 PROBLEM — D22.71 MELANOCYTIC NEVI OF RIGHT LOWER LIMB, INCLUDING HIP: Status: ACTIVE | Noted: 2025-07-18

## 2025-07-18 PROBLEM — D22.62 MELANOCYTIC NEVI OF LEFT UPPER LIMB, INCLUDING SHOULDER: Status: ACTIVE | Noted: 2025-07-18

## 2025-07-18 PROCEDURE — OTHER PRESCRIPTION MEDICATION MANAGEMENT: OTHER

## 2025-07-18 PROCEDURE — 99213 OFFICE O/P EST LOW 20 MIN: CPT

## 2025-07-18 PROCEDURE — OTHER PATIENT SPECIFIC COUNSELING: OTHER

## 2025-07-18 PROCEDURE — OTHER MIPS QUALITY: OTHER

## 2025-07-18 PROCEDURE — OTHER COUNSELING: OTHER

## 2025-07-18 PROCEDURE — OTHER SUNSCREEN RECOMMENDATIONS: OTHER

## 2025-07-18 PROCEDURE — OTHER PRESCRIPTION: OTHER

## 2025-07-18 RX ORDER — DESONIDE 0.5 MG/G
0.05% CREAM TOPICAL BID
Qty: 60 | Refills: 1 | Status: ERX | COMMUNITY
Start: 2025-07-18

## 2025-07-18 ASSESSMENT — LOCATION SIMPLE DESCRIPTION DERM
LOCATION SIMPLE: LEFT UPPER BACK
LOCATION SIMPLE: RIGHT THIGH
LOCATION SIMPLE: LEFT CHEEK
LOCATION SIMPLE: RIGHT SHOULDER
LOCATION SIMPLE: ABDOMEN
LOCATION SIMPLE: CHEST
LOCATION SIMPLE: LEFT FOREARM
LOCATION SIMPLE: LEFT LOWER BACK
LOCATION SIMPLE: LEFT UPPER ARM
LOCATION SIMPLE: LEFT THIGH
LOCATION SIMPLE: UPPER BACK
LOCATION SIMPLE: RIGHT FOREARM

## 2025-07-18 ASSESSMENT — LOCATION DETAILED DESCRIPTION DERM
LOCATION DETAILED: MIDDLE STERNUM
LOCATION DETAILED: RIGHT VENTRAL DISTAL FOREARM
LOCATION DETAILED: LEFT MEDIAL UPPER BACK
LOCATION DETAILED: RIGHT ANTERIOR SHOULDER
LOCATION DETAILED: RIGHT ANTERIOR DISTAL THIGH
LOCATION DETAILED: RIGHT PROXIMAL DORSAL FOREARM
LOCATION DETAILED: RIGHT VENTRAL PROXIMAL FOREARM
LOCATION DETAILED: EPIGASTRIC SKIN
LOCATION DETAILED: LEFT ANTERIOR PROXIMAL THIGH
LOCATION DETAILED: RIGHT ANTERIOR PROXIMAL THIGH
LOCATION DETAILED: LEFT DISTAL DORSAL FOREARM
LOCATION DETAILED: LEFT INFERIOR CENTRAL MALAR CHEEK
LOCATION DETAILED: INFERIOR THORACIC SPINE
LOCATION DETAILED: LEFT ANTERIOR DISTAL THIGH
LOCATION DETAILED: LEFT VENTRAL PROXIMAL FOREARM
LOCATION DETAILED: LEFT INFERIOR LATERAL MIDBACK
LOCATION DETAILED: LEFT ANTECUBITAL SKIN
LOCATION DETAILED: LEFT VENTRAL DISTAL FOREARM

## 2025-07-18 ASSESSMENT — LOCATION ZONE DERM
LOCATION ZONE: TRUNK
LOCATION ZONE: FACE
LOCATION ZONE: LEG
LOCATION ZONE: ARM

## 2025-08-14 ENCOUNTER — HOSPITAL ENCOUNTER (EMERGENCY)
Facility: CLINIC | Age: 80
Discharge: HOME | End: 2025-08-14
Attending: EMERGENCY MEDICINE
Payer: COMMERCIAL

## 2025-08-14 VITALS
SYSTOLIC BLOOD PRESSURE: 116 MMHG | OXYGEN SATURATION: 99 % | TEMPERATURE: 96.6 F | WEIGHT: 241.62 LBS | HEIGHT: 72 IN | DIASTOLIC BLOOD PRESSURE: 61 MMHG | RESPIRATION RATE: 18 BRPM | BODY MASS INDEX: 32.73 KG/M2 | HEART RATE: 81 BPM

## 2025-08-14 DIAGNOSIS — R33.8 POSTOPERATIVE URINARY RETENTION: Primary | ICD-10-CM

## 2025-08-14 DIAGNOSIS — N99.89 POSTOPERATIVE URINARY RETENTION: Primary | ICD-10-CM

## 2025-08-14 DIAGNOSIS — R82.90 ABNORMAL FINDING ON URINALYSIS: ICD-10-CM

## 2025-08-14 DIAGNOSIS — Z98.890 HISTORY OF BLADDER SURGERY: ICD-10-CM

## 2025-08-14 LAB
ALBUMIN UR-MCNC: 30 MG/DL
APPEARANCE UR: ABNORMAL
BILIRUB UR QL STRIP: NEGATIVE
COLOR UR AUTO: ABNORMAL
GLUCOSE UR STRIP-MCNC: NEGATIVE MG/DL
HGB UR QL STRIP: ABNORMAL
KETONES UR STRIP-MCNC: NEGATIVE MG/DL
LEUKOCYTE ESTERASE UR QL STRIP: NEGATIVE
NITRATE UR QL: POSITIVE
PH UR STRIP: 6 [PH] (ref 5–7)
RBC URINE: >182 /HPF
SP GR UR STRIP: 1.01 (ref 1–1.03)
UROBILINOGEN UR STRIP-MCNC: NORMAL MG/DL
WBC URINE: 0 /HPF

## 2025-08-14 PROCEDURE — 99284 EMERGENCY DEPT VISIT MOD MDM: CPT | Performed by: EMERGENCY MEDICINE

## 2025-08-14 PROCEDURE — 81003 URINALYSIS AUTO W/O SCOPE: CPT | Performed by: EMERGENCY MEDICINE

## 2025-08-14 PROCEDURE — 99284 EMERGENCY DEPT VISIT MOD MDM: CPT

## 2025-08-14 PROCEDURE — 250N000011 HC RX IP 250 OP 636: Performed by: EMERGENCY MEDICINE

## 2025-08-14 PROCEDURE — 250N000013 HC RX MED GY IP 250 OP 250 PS 637: Performed by: EMERGENCY MEDICINE

## 2025-08-14 PROCEDURE — 87086 URINE CULTURE/COLONY COUNT: CPT | Performed by: EMERGENCY MEDICINE

## 2025-08-14 PROCEDURE — 51702 INSERT TEMP BLADDER CATH: CPT

## 2025-08-14 RX ORDER — ONDANSETRON 4 MG/1
4 TABLET, ORALLY DISINTEGRATING ORAL ONCE
Status: COMPLETED | OUTPATIENT
Start: 2025-08-14 | End: 2025-08-14

## 2025-08-14 RX ADMIN — LEVOFLOXACIN 750 MG: 500 TABLET, FILM COATED ORAL at 16:46

## 2025-08-14 RX ADMIN — ONDANSETRON 4 MG: 4 TABLET, ORALLY DISINTEGRATING ORAL at 15:25

## 2025-08-14 ASSESSMENT — ACTIVITIES OF DAILY LIVING (ADL)
ADLS_ACUITY_SCORE: 44

## 2025-08-14 ASSESSMENT — COLUMBIA-SUICIDE SEVERITY RATING SCALE - C-SSRS
1. IN THE PAST MONTH, HAVE YOU WISHED YOU WERE DEAD OR WISHED YOU COULD GO TO SLEEP AND NOT WAKE UP?: NO
6. HAVE YOU EVER DONE ANYTHING, STARTED TO DO ANYTHING, OR PREPARED TO DO ANYTHING TO END YOUR LIFE?: NO
2. HAVE YOU ACTUALLY HAD ANY THOUGHTS OF KILLING YOURSELF IN THE PAST MONTH?: NO

## 2025-08-16 LAB — BACTERIA UR CULT: NO GROWTH

## (undated) DEVICE — IMM PILLOW ABDUCT HIP MED M60-025-M

## (undated) DEVICE — PACK TOTAL HIP RIDGES LATEX PO15HIFSG

## (undated) DEVICE — GLOVE PROTEXIS BLUE W/NEU-THERA 7.5  2D73EB75

## (undated) DEVICE — BAG CLEAR TRASH 1.3M 39X33" P4040C

## (undated) DEVICE — SET HANDPIECE INTERPULSE W/COAXIAL FAN SPRAY TIP 0210118000

## (undated) DEVICE — SOL WATER IRRIG 1000ML BOTTLE 2F7114

## (undated) DEVICE — SUCTION MANIFOLD NEPTUNE 2 SYS 4 PORT 0702-020-000

## (undated) DEVICE — SU VICRYL 1 CTX 36" J371H

## (undated) DEVICE — DRSG STERI STRIP 1/2X4" R1547

## (undated) DEVICE — SU VICRYL 2-0 CT-1 36" UND J945H

## (undated) DEVICE — LINEN ORTHO PACK 5446

## (undated) DEVICE — LINEN DRAPE 54X72" 5467

## (undated) DEVICE — LINEN FULL SHEET 5511

## (undated) DEVICE — DRSG AQUACEL AG 3.5X12" HYDROFIBER 420670

## (undated) DEVICE — GLOVE PROTEXIS POWDER FREE 7.5 ORTHOPEDIC 2D73ET75

## (undated) DEVICE — GLOVE PROTEXIS POWDER FREE 7.0 ORTHOPEDIC 2D73ET70

## (undated) DEVICE — LINEN HALF SHEET 5512

## (undated) DEVICE — SOL ADH LIQUID BENZOIN SWAB 0.6ML C1544

## (undated) DEVICE — BLADE SAW SAGITTAL STRK 25X90X1.27MM HD SYS 6 6125-127-090

## (undated) DEVICE — DRAPE IOBAN INCISE 23X17" 6650EZ

## (undated) DEVICE — LINEN ORTHO ACL PACK 5447

## (undated) DEVICE — PREP CHLORAPREP 26ML TINTED ORANGE  260815

## (undated) DEVICE — STPL SKIN SUBCUTICULAR INSORB  2030

## (undated) RX ORDER — GLYCOPYRROLATE 0.2 MG/ML
INJECTION INTRAMUSCULAR; INTRAVENOUS
Status: DISPENSED
Start: 2017-10-18

## (undated) RX ORDER — KETOROLAC TROMETHAMINE 15 MG/ML
INJECTION, SOLUTION INTRAMUSCULAR; INTRAVENOUS
Status: DISPENSED
Start: 2018-07-06

## (undated) RX ORDER — ONDANSETRON 2 MG/ML
INJECTION INTRAMUSCULAR; INTRAVENOUS
Status: DISPENSED
Start: 2017-10-18

## (undated) RX ORDER — DEXAMETHASONE SODIUM PHOSPHATE 4 MG/ML
INJECTION, SOLUTION INTRA-ARTICULAR; INTRALESIONAL; INTRAMUSCULAR; INTRAVENOUS; SOFT TISSUE
Status: DISPENSED
Start: 2018-07-06

## (undated) RX ORDER — PROPOFOL 10 MG/ML
INJECTION, EMULSION INTRAVENOUS
Status: DISPENSED
Start: 2017-10-18

## (undated) RX ORDER — KETOROLAC TROMETHAMINE 30 MG/ML
INJECTION, SOLUTION INTRAMUSCULAR; INTRAVENOUS
Status: DISPENSED
Start: 2017-10-18

## (undated) RX ORDER — GLYCOPYRROLATE 0.2 MG/ML
INJECTION INTRAMUSCULAR; INTRAVENOUS
Status: DISPENSED
Start: 2018-07-06

## (undated) RX ORDER — LIDOCAINE HYDROCHLORIDE 10 MG/ML
INJECTION, SOLUTION EPIDURAL; INFILTRATION; INTRACAUDAL; PERINEURAL
Status: DISPENSED
Start: 2018-07-06

## (undated) RX ORDER — NEOSTIGMINE METHYLSULFATE 1 MG/ML
VIAL (ML) INJECTION
Status: DISPENSED
Start: 2018-07-06

## (undated) RX ORDER — HYDROMORPHONE HYDROCHLORIDE 1 MG/ML
INJECTION, SOLUTION INTRAMUSCULAR; INTRAVENOUS; SUBCUTANEOUS
Status: DISPENSED
Start: 2018-07-06

## (undated) RX ORDER — FENTANYL CITRATE 50 UG/ML
INJECTION, SOLUTION INTRAMUSCULAR; INTRAVENOUS
Status: DISPENSED
Start: 2018-07-06

## (undated) RX ORDER — ONDANSETRON 2 MG/ML
INJECTION INTRAMUSCULAR; INTRAVENOUS
Status: DISPENSED
Start: 2018-07-06

## (undated) RX ORDER — LIDOCAINE HYDROCHLORIDE 10 MG/ML
INJECTION, SOLUTION EPIDURAL; INFILTRATION; INTRACAUDAL; PERINEURAL
Status: DISPENSED
Start: 2017-10-18

## (undated) RX ORDER — CEFAZOLIN SODIUM 2 G/100ML
INJECTION, SOLUTION INTRAVENOUS
Status: DISPENSED
Start: 2018-07-06

## (undated) RX ORDER — CEFAZOLIN SODIUM 2 G/100ML
INJECTION, SOLUTION INTRAVENOUS
Status: DISPENSED
Start: 2017-10-18

## (undated) RX ORDER — FENTANYL CITRATE 50 UG/ML
INJECTION, SOLUTION INTRAMUSCULAR; INTRAVENOUS
Status: DISPENSED
Start: 2017-10-18

## (undated) RX ORDER — PROPOFOL 10 MG/ML
INJECTION, EMULSION INTRAVENOUS
Status: DISPENSED
Start: 2018-07-06

## (undated) RX ORDER — DEXAMETHASONE SODIUM PHOSPHATE 4 MG/ML
INJECTION, SOLUTION INTRA-ARTICULAR; INTRALESIONAL; INTRAMUSCULAR; INTRAVENOUS; SOFT TISSUE
Status: DISPENSED
Start: 2017-10-18